# Patient Record
Sex: MALE | Race: WHITE | Employment: OTHER | URBAN - METROPOLITAN AREA
[De-identification: names, ages, dates, MRNs, and addresses within clinical notes are randomized per-mention and may not be internally consistent; named-entity substitution may affect disease eponyms.]

---

## 2020-02-04 ENCOUNTER — APPOINTMENT (OUTPATIENT)
Dept: CT IMAGING | Age: 70
DRG: 661 | End: 2020-02-04
Payer: MEDICARE

## 2020-02-04 ENCOUNTER — HOSPITAL ENCOUNTER (INPATIENT)
Age: 70
LOS: 1 days | Discharge: HOME | DRG: 661 | End: 2020-02-05
Attending: EMERGENCY MEDICINE | Admitting: FAMILY MEDICINE
Payer: MEDICARE

## 2020-02-04 PROBLEM — N23 URETERIC COLIC: Status: ACTIVE | Noted: 2020-02-04

## 2020-02-04 LAB
-: ABNORMAL
ABSOLUTE EOS #: 0.12 K/UL (ref 0–0.44)
ABSOLUTE IMMATURE GRANULOCYTE: 0.04 K/UL (ref 0–0.3)
ABSOLUTE LYMPH #: 1.65 K/UL (ref 1.1–3.7)
ABSOLUTE MONO #: 0.89 K/UL (ref 0.1–1.2)
ALBUMIN SERPL-MCNC: 4.5 G/DL (ref 3.5–5.2)
ALBUMIN/GLOBULIN RATIO: ABNORMAL (ref 1–2.5)
ALP BLD-CCNC: 80 U/L (ref 40–129)
ALT SERPL-CCNC: 56 U/L (ref 5–41)
AMORPHOUS: ABNORMAL
AMYLASE: 62 U/L (ref 28–100)
ANION GAP SERPL CALCULATED.3IONS-SCNC: 13 MMOL/L (ref 9–17)
APPEARANCE: NORMAL
AST SERPL-CCNC: 35 U/L
BACTERIA: ABNORMAL
BASOPHILS # BLD: 0 % (ref 0–2)
BASOPHILS ABSOLUTE: <0.03 K/UL (ref 0–0.2)
BILIRUB SERPL-MCNC: 0.7 MG/DL (ref 0.3–1.2)
BILIRUBIN DIRECT: 0.16 MG/DL
BILIRUBIN URINE: NEGATIVE
BILIRUBIN, INDIRECT: 0.54 MG/DL (ref 0–1)
BILIRUBIN, POC: NORMAL
BLOOD URINE, POC: NORMAL
BUN BLDV-MCNC: 17 MG/DL (ref 8–23)
BUN/CREAT BLD: 19 (ref 9–20)
CALCIUM SERPL-MCNC: 9.8 MG/DL (ref 8.6–10.4)
CASTS UA: ABNORMAL /LPF
CASTS UA: ABNORMAL /LPF
CHLORIDE BLD-SCNC: 102 MMOL/L (ref 98–107)
CHP ED QC CHECK: YES
CLARITY, POC: NORMAL
CO2: 25 MMOL/L (ref 20–31)
COLOR, POC: YELLOW
COLOR: YELLOW
COMMENT UA: ABNORMAL
CREAT SERPL-MCNC: 0.89 MG/DL (ref 0.7–1.2)
CRYSTALS, UA: ABNORMAL /HPF
DIFFERENTIAL TYPE: ABNORMAL
EOSINOPHILS RELATIVE PERCENT: 1 % (ref 1–4)
EPITHELIAL CELLS UA: ABNORMAL /HPF (ref 0–5)
GFR AFRICAN AMERICAN: >60 ML/MIN
GFR NON-AFRICAN AMERICAN: >60 ML/MIN
GFR SERPL CREATININE-BSD FRML MDRD: ABNORMAL ML/MIN/{1.73_M2}
GFR SERPL CREATININE-BSD FRML MDRD: ABNORMAL ML/MIN/{1.73_M2}
GLOBULIN: ABNORMAL G/DL (ref 1.5–3.8)
GLUCOSE BLD-MCNC: 155 MG/DL (ref 70–99)
GLUCOSE URINE, POC: NORMAL
GLUCOSE URINE: NEGATIVE
HCT VFR BLD CALC: 45.4 % (ref 40.7–50.3)
HEMOGLOBIN: 15.2 G/DL (ref 13–17)
IMMATURE GRANULOCYTES: 0 %
KETONES, POC: NORMAL
KETONES, URINE: NEGATIVE
LEUKOCYTE EST, POC: NORMAL
LEUKOCYTE ESTERASE, URINE: NEGATIVE
LIPASE: 24 U/L (ref 13–60)
LYMPHOCYTES # BLD: 13 % (ref 24–43)
MCH RBC QN AUTO: 31.7 PG (ref 25.2–33.5)
MCHC RBC AUTO-ENTMCNC: 33.5 G/DL (ref 28–38)
MCV RBC AUTO: 94.6 FL (ref 82.6–102.9)
MONOCYTES # BLD: 7 % (ref 3–12)
MUCUS: ABNORMAL
NITRITE, POC: NORMAL
NITRITE, URINE: NEGATIVE
NRBC AUTOMATED: ABNORMAL PER 100 WBC
OTHER OBSERVATIONS UA: ABNORMAL
PDW BLD-RTO: 12.5 % (ref 11.8–14.4)
PH UA: 6.5 (ref 5–8)
PH, POC: 7
PLATELET # BLD: 247 K/UL (ref 138–453)
PLATELET ESTIMATE: ABNORMAL
PMV BLD AUTO: 9.2 FL (ref 8.1–13.5)
POTASSIUM SERPL-SCNC: 4.4 MMOL/L (ref 3.7–5.3)
PROTEIN UA: ABNORMAL
PROTEIN, POC: NORMAL
RBC # BLD: 4.8 M/UL (ref 4.21–5.77)
RBC # BLD: ABNORMAL 10*6/UL
RBC UA: ABNORMAL /HPF (ref 0–2)
RENAL EPITHELIAL, UA: ABNORMAL /HPF
SEG NEUTROPHILS: 79 % (ref 36–65)
SEGMENTED NEUTROPHILS ABSOLUTE COUNT: 10.27 K/UL (ref 1.5–8.1)
SODIUM BLD-SCNC: 140 MMOL/L (ref 135–144)
SPECIFIC GRAVITY UA: 1.02 (ref 1–1.03)
SPECIFIC GRAVITY, POC: 1.02
TOTAL PROTEIN: 7.6 G/DL (ref 6.4–8.3)
TRICHOMONAS: ABNORMAL
TURBIDITY: ABNORMAL
URINE HGB: ABNORMAL
UROBILINOGEN, POC: 1
UROBILINOGEN, URINE: NORMAL
WBC # BLD: 13 K/UL (ref 3.5–11.3)
WBC # BLD: ABNORMAL 10*3/UL
WBC UA: ABNORMAL /HPF (ref 0–5)
YEAST: ABNORMAL

## 2020-02-04 PROCEDURE — 96376 TX/PRO/DX INJ SAME DRUG ADON: CPT

## 2020-02-04 PROCEDURE — 80076 HEPATIC FUNCTION PANEL: CPT

## 2020-02-04 PROCEDURE — 6360000002 HC RX W HCPCS: Performed by: EMERGENCY MEDICINE

## 2020-02-04 PROCEDURE — 82150 ASSAY OF AMYLASE: CPT

## 2020-02-04 PROCEDURE — 2580000003 HC RX 258: Performed by: NURSE PRACTITIONER

## 2020-02-04 PROCEDURE — 83690 ASSAY OF LIPASE: CPT

## 2020-02-04 PROCEDURE — 96375 TX/PRO/DX INJ NEW DRUG ADDON: CPT

## 2020-02-04 PROCEDURE — 80048 BASIC METABOLIC PNL TOTAL CA: CPT

## 2020-02-04 PROCEDURE — 96374 THER/PROPH/DIAG INJ IV PUSH: CPT

## 2020-02-04 PROCEDURE — 85025 COMPLETE CBC W/AUTO DIFF WBC: CPT

## 2020-02-04 PROCEDURE — 74176 CT ABD & PELVIS W/O CONTRAST: CPT

## 2020-02-04 PROCEDURE — 6360000002 HC RX W HCPCS: Performed by: NURSE PRACTITIONER

## 2020-02-04 PROCEDURE — 99285 EMERGENCY DEPT VISIT HI MDM: CPT

## 2020-02-04 PROCEDURE — 96361 HYDRATE IV INFUSION ADD-ON: CPT

## 2020-02-04 PROCEDURE — 1200000000 HC SEMI PRIVATE

## 2020-02-04 PROCEDURE — 81001 URINALYSIS AUTO W/SCOPE: CPT

## 2020-02-04 RX ORDER — MORPHINE SULFATE 4 MG/ML
4 INJECTION, SOLUTION INTRAMUSCULAR; INTRAVENOUS ONCE
Status: COMPLETED | OUTPATIENT
Start: 2020-02-04 | End: 2020-02-04

## 2020-02-04 RX ORDER — PEDIATRIC MULTIPLE VITAMIN LIQ
5 LIQUID ORAL
Status: ON HOLD | COMMUNITY
End: 2020-02-13 | Stop reason: CLARIF

## 2020-02-04 RX ORDER — RAMIPRIL 10 MG/1
CAPSULE ORAL 2 TIMES DAILY
COMMUNITY
Start: 2020-01-12

## 2020-02-04 RX ORDER — GLUCOSAMINE HCL 500 MG
100 TABLET ORAL 2 TIMES DAILY
COMMUNITY

## 2020-02-04 RX ORDER — ROSUVASTATIN CALCIUM 40 MG/1
40 TABLET, COATED ORAL DAILY
COMMUNITY
Start: 2020-01-12

## 2020-02-04 RX ORDER — CARVEDILOL 25 MG/1
25 TABLET ORAL 2 TIMES DAILY
COMMUNITY
Start: 2020-01-12

## 2020-02-04 RX ORDER — 0.9 % SODIUM CHLORIDE 0.9 %
1000 INTRAVENOUS SOLUTION INTRAVENOUS ONCE
Status: COMPLETED | OUTPATIENT
Start: 2020-02-04 | End: 2020-02-04

## 2020-02-04 RX ORDER — ASPIRIN 325 MG
325 TABLET ORAL
Status: ON HOLD | COMMUNITY
End: 2020-02-05 | Stop reason: HOSPADM

## 2020-02-04 RX ORDER — VIT C/B6/B5/MAGNESIUM/HERB 173 50-5-6-5MG
CAPSULE ORAL 2 TIMES DAILY
COMMUNITY

## 2020-02-04 RX ORDER — ONDANSETRON 2 MG/ML
4 INJECTION INTRAMUSCULAR; INTRAVENOUS ONCE
Status: COMPLETED | OUTPATIENT
Start: 2020-02-04 | End: 2020-02-04

## 2020-02-04 RX ADMIN — MORPHINE SULFATE 4 MG: 4 INJECTION INTRAVENOUS at 22:53

## 2020-02-04 RX ADMIN — ONDANSETRON 4 MG: 2 INJECTION INTRAMUSCULAR; INTRAVENOUS at 22:53

## 2020-02-04 RX ADMIN — SODIUM CHLORIDE 1000 ML: 9 INJECTION, SOLUTION INTRAVENOUS at 22:53

## 2020-02-04 RX ADMIN — MORPHINE SULFATE 4 MG: 4 INJECTION INTRAVENOUS at 23:34

## 2020-02-04 ASSESSMENT — ENCOUNTER SYMPTOMS
SHORTNESS OF BREATH: 0
ABDOMINAL PAIN: 1
ABDOMINAL DISTENTION: 1
VOMITING: 1
NAUSEA: 1

## 2020-02-04 ASSESSMENT — PAIN SCALES - GENERAL
PAINLEVEL_OUTOF10: 10
PAINLEVEL_OUTOF10: 8
PAINLEVEL_OUTOF10: 10

## 2020-02-04 ASSESSMENT — PAIN DESCRIPTION - FREQUENCY: FREQUENCY: CONTINUOUS

## 2020-02-04 ASSESSMENT — PAIN DESCRIPTION - LOCATION: LOCATION: BACK;ABDOMEN

## 2020-02-04 ASSESSMENT — PAIN DESCRIPTION - DESCRIPTORS: DESCRIPTORS: DISCOMFORT

## 2020-02-05 ENCOUNTER — APPOINTMENT (OUTPATIENT)
Dept: GENERAL RADIOLOGY | Age: 70
DRG: 661 | End: 2020-02-05
Payer: MEDICARE

## 2020-02-05 ENCOUNTER — ANESTHESIA EVENT (OUTPATIENT)
Dept: OPERATING ROOM | Age: 70
DRG: 661 | End: 2020-02-05
Payer: MEDICARE

## 2020-02-05 ENCOUNTER — ANESTHESIA (OUTPATIENT)
Dept: OPERATING ROOM | Age: 70
DRG: 661 | End: 2020-02-05
Payer: MEDICARE

## 2020-02-05 VITALS
WEIGHT: 184.2 LBS | RESPIRATION RATE: 18 BRPM | BODY MASS INDEX: 27.28 KG/M2 | OXYGEN SATURATION: 94 % | SYSTOLIC BLOOD PRESSURE: 118 MMHG | HEART RATE: 68 BPM | HEIGHT: 69 IN | TEMPERATURE: 98.6 F | DIASTOLIC BLOOD PRESSURE: 61 MMHG

## 2020-02-05 VITALS — SYSTOLIC BLOOD PRESSURE: 118 MMHG | OXYGEN SATURATION: 93 % | DIASTOLIC BLOOD PRESSURE: 56 MMHG

## 2020-02-05 LAB
-: NORMAL
AMORPHOUS: NORMAL
ANION GAP SERPL CALCULATED.3IONS-SCNC: 9 MMOL/L (ref 9–17)
BACTERIA: NORMAL
BILIRUBIN URINE: NEGATIVE
BUN BLDV-MCNC: 20 MG/DL (ref 8–23)
BUN/CREAT BLD: 18 (ref 9–20)
CALCIUM SERPL-MCNC: 9.1 MG/DL (ref 8.6–10.4)
CASTS UA: NORMAL /LPF
CHLORIDE BLD-SCNC: 106 MMOL/L (ref 98–107)
CO2: 27 MMOL/L (ref 20–31)
COLOR: YELLOW
COMMENT UA: ABNORMAL
CREAT SERPL-MCNC: 1.09 MG/DL (ref 0.7–1.2)
CRYSTALS, UA: NORMAL /HPF
EPITHELIAL CELLS UA: NORMAL /HPF (ref 0–5)
GFR AFRICAN AMERICAN: >60 ML/MIN
GFR NON-AFRICAN AMERICAN: >60 ML/MIN
GFR SERPL CREATININE-BSD FRML MDRD: ABNORMAL ML/MIN/{1.73_M2}
GFR SERPL CREATININE-BSD FRML MDRD: ABNORMAL ML/MIN/{1.73_M2}
GLUCOSE BLD-MCNC: 110 MG/DL (ref 75–110)
GLUCOSE BLD-MCNC: 120 MG/DL (ref 75–110)
GLUCOSE BLD-MCNC: 123 MG/DL (ref 75–110)
GLUCOSE BLD-MCNC: 132 MG/DL (ref 70–99)
GLUCOSE BLD-MCNC: 97 MG/DL (ref 75–110)
GLUCOSE URINE: NEGATIVE
HCT VFR BLD CALC: 41.1 % (ref 40.7–50.3)
HEMOGLOBIN: 14.1 G/DL (ref 13–17)
KETONES, URINE: NEGATIVE
LEUKOCYTE ESTERASE, URINE: NEGATIVE
MCH RBC QN AUTO: 32.3 PG (ref 25.2–33.5)
MCHC RBC AUTO-ENTMCNC: 34.3 G/DL (ref 28–38)
MCV RBC AUTO: 94.3 FL (ref 82.6–102.9)
MUCUS: NORMAL
NITRITE, URINE: NEGATIVE
NRBC AUTOMATED: ABNORMAL PER 100 WBC
OTHER OBSERVATIONS UA: NORMAL
PDW BLD-RTO: 12.6 % (ref 11.8–14.4)
PH UA: 6 (ref 5–8)
PLATELET # BLD: 212 K/UL (ref 138–453)
PMV BLD AUTO: 9.1 FL (ref 8.1–13.5)
POTASSIUM SERPL-SCNC: 4.8 MMOL/L (ref 3.7–5.3)
PROTEIN UA: NEGATIVE
RBC # BLD: 4.36 M/UL (ref 4.21–5.77)
RBC UA: NORMAL /HPF (ref 0–2)
RENAL EPITHELIAL, UA: NORMAL /HPF
SODIUM BLD-SCNC: 142 MMOL/L (ref 135–144)
SPECIFIC GRAVITY UA: 1.01 (ref 1–1.03)
TRICHOMONAS: NORMAL
TURBIDITY: CLEAR
URINE HGB: ABNORMAL
UROBILINOGEN, URINE: NORMAL
WBC # BLD: 11.6 K/UL (ref 3.5–11.3)
WBC UA: NORMAL /HPF (ref 0–5)
YEAST: NORMAL

## 2020-02-05 PROCEDURE — C2617 STENT, NON-COR, TEM W/O DEL: HCPCS | Performed by: UROLOGY

## 2020-02-05 PROCEDURE — 7100000001 HC PACU RECOVERY - ADDTL 15 MIN: Performed by: UROLOGY

## 2020-02-05 PROCEDURE — 6370000000 HC RX 637 (ALT 250 FOR IP): Performed by: UROLOGY

## 2020-02-05 PROCEDURE — 2709999900 HC NON-CHARGEABLE SUPPLY: Performed by: UROLOGY

## 2020-02-05 PROCEDURE — 2500000003 HC RX 250 WO HCPCS: Performed by: NURSE ANESTHETIST, CERTIFIED REGISTERED

## 2020-02-05 PROCEDURE — 6370000000 HC RX 637 (ALT 250 FOR IP): Performed by: FAMILY MEDICINE

## 2020-02-05 PROCEDURE — 3600000002 HC SURGERY LEVEL 2 BASE: Performed by: UROLOGY

## 2020-02-05 PROCEDURE — 3700000001 HC ADD 15 MINUTES (ANESTHESIA): Performed by: UROLOGY

## 2020-02-05 PROCEDURE — 36415 COLL VENOUS BLD VENIPUNCTURE: CPT

## 2020-02-05 PROCEDURE — 6360000002 HC RX W HCPCS: Performed by: ANESTHESIOLOGY

## 2020-02-05 PROCEDURE — 87086 URINE CULTURE/COLONY COUNT: CPT

## 2020-02-05 PROCEDURE — 6360000002 HC RX W HCPCS: Performed by: NURSE PRACTITIONER

## 2020-02-05 PROCEDURE — 3209999900 FLUORO FOR SURGICAL PROCEDURES

## 2020-02-05 PROCEDURE — 3700000000 HC ANESTHESIA ATTENDED CARE: Performed by: UROLOGY

## 2020-02-05 PROCEDURE — 74420 UROGRAPHY RTRGR +-KUB: CPT

## 2020-02-05 PROCEDURE — 81001 URINALYSIS AUTO W/SCOPE: CPT

## 2020-02-05 PROCEDURE — 3600000012 HC SURGERY LEVEL 2 ADDTL 15MIN: Performed by: UROLOGY

## 2020-02-05 PROCEDURE — 80048 BASIC METABOLIC PNL TOTAL CA: CPT

## 2020-02-05 PROCEDURE — C1758 CATHETER, URETERAL: HCPCS | Performed by: UROLOGY

## 2020-02-05 PROCEDURE — 6360000002 HC RX W HCPCS: Performed by: UROLOGY

## 2020-02-05 PROCEDURE — 7100000000 HC PACU RECOVERY - FIRST 15 MIN: Performed by: UROLOGY

## 2020-02-05 PROCEDURE — 74018 RADEX ABDOMEN 1 VIEW: CPT

## 2020-02-05 PROCEDURE — 6360000002 HC RX W HCPCS: Performed by: FAMILY MEDICINE

## 2020-02-05 PROCEDURE — 6360000002 HC RX W HCPCS: Performed by: NURSE ANESTHETIST, CERTIFIED REGISTERED

## 2020-02-05 PROCEDURE — 85027 COMPLETE CBC AUTOMATED: CPT

## 2020-02-05 PROCEDURE — 2580000003 HC RX 258: Performed by: NURSE ANESTHETIST, CERTIFIED REGISTERED

## 2020-02-05 PROCEDURE — BT141ZZ FLUOROSCOPY OF KIDNEYS, URETERS AND BLADDER USING LOW OSMOLAR CONTRAST: ICD-10-PCS | Performed by: UROLOGY

## 2020-02-05 PROCEDURE — 0T778DZ DILATION OF LEFT URETER WITH INTRALUMINAL DEVICE, VIA NATURAL OR ARTIFICIAL OPENING ENDOSCOPIC: ICD-10-PCS | Performed by: UROLOGY

## 2020-02-05 PROCEDURE — 2580000003 HC RX 258: Performed by: FAMILY MEDICINE

## 2020-02-05 PROCEDURE — 82947 ASSAY GLUCOSE BLOOD QUANT: CPT

## 2020-02-05 PROCEDURE — 6360000004 HC RX CONTRAST MEDICATION: Performed by: UROLOGY

## 2020-02-05 PROCEDURE — C1769 GUIDE WIRE: HCPCS | Performed by: UROLOGY

## 2020-02-05 DEVICE — URETERAL STENT
Type: IMPLANTABLE DEVICE | Site: URETER | Status: FUNCTIONAL
Brand: POLARIS™ ULTRA

## 2020-02-05 RX ORDER — HYDROCODONE BITARTRATE AND ACETAMINOPHEN 5; 325 MG/1; MG/1
1 TABLET ORAL EVERY 6 HOURS PRN
Qty: 28 TABLET | Refills: 0 | Status: SHIPPED | OUTPATIENT
Start: 2020-02-05 | End: 2020-02-12

## 2020-02-05 RX ORDER — SODIUM CHLORIDE 0.9 % (FLUSH) 0.9 %
10 SYRINGE (ML) INJECTION PRN
Status: DISCONTINUED | OUTPATIENT
Start: 2020-02-05 | End: 2020-02-05 | Stop reason: HOSPADM

## 2020-02-05 RX ORDER — PROPOFOL 10 MG/ML
INJECTION, EMULSION INTRAVENOUS PRN
Status: DISCONTINUED | OUTPATIENT
Start: 2020-02-05 | End: 2020-02-05 | Stop reason: SDUPTHER

## 2020-02-05 RX ORDER — OXYCODONE HYDROCHLORIDE AND ACETAMINOPHEN 5; 325 MG/1; MG/1
1 TABLET ORAL PRN
Status: DISCONTINUED | OUTPATIENT
Start: 2020-02-05 | End: 2020-02-05 | Stop reason: HOSPADM

## 2020-02-05 RX ORDER — HYDRALAZINE HYDROCHLORIDE 20 MG/ML
5 INJECTION INTRAMUSCULAR; INTRAVENOUS EVERY 10 MIN PRN
Status: DISCONTINUED | OUTPATIENT
Start: 2020-02-05 | End: 2020-02-05 | Stop reason: HOSPADM

## 2020-02-05 RX ORDER — CIPROFLOXACIN 2 MG/ML
400 INJECTION, SOLUTION INTRAVENOUS EVERY 12 HOURS
Status: DISCONTINUED | OUTPATIENT
Start: 2020-02-05 | End: 2020-02-05 | Stop reason: HOSPADM

## 2020-02-05 RX ORDER — ONDANSETRON 4 MG/1
4 TABLET, ORALLY DISINTEGRATING ORAL EVERY 6 HOURS PRN
Status: DISCONTINUED | OUTPATIENT
Start: 2020-02-05 | End: 2020-02-05 | Stop reason: HOSPADM

## 2020-02-05 RX ORDER — ACETAMINOPHEN 325 MG/1
650 TABLET ORAL EVERY 4 HOURS PRN
Status: DISCONTINUED | OUTPATIENT
Start: 2020-02-05 | End: 2020-02-05 | Stop reason: HOSPADM

## 2020-02-05 RX ORDER — FENTANYL CITRATE 50 UG/ML
25 INJECTION, SOLUTION INTRAMUSCULAR; INTRAVENOUS EVERY 5 MIN PRN
Status: DISCONTINUED | OUTPATIENT
Start: 2020-02-05 | End: 2020-02-05 | Stop reason: HOSPADM

## 2020-02-05 RX ORDER — HYDROMORPHONE HCL 110MG/55ML
0.5 PATIENT CONTROLLED ANALGESIA SYRINGE INTRAVENOUS EVERY 5 MIN PRN
Status: DISCONTINUED | OUTPATIENT
Start: 2020-02-05 | End: 2020-02-05 | Stop reason: HOSPADM

## 2020-02-05 RX ORDER — NICOTINE 21 MG/24HR
1 PATCH, TRANSDERMAL 24 HOURS TRANSDERMAL DAILY PRN
Status: DISCONTINUED | OUTPATIENT
Start: 2020-02-05 | End: 2020-02-05 | Stop reason: HOSPADM

## 2020-02-05 RX ORDER — OXYCODONE HYDROCHLORIDE AND ACETAMINOPHEN 5; 325 MG/1; MG/1
2 TABLET ORAL PRN
Status: DISCONTINUED | OUTPATIENT
Start: 2020-02-05 | End: 2020-02-05 | Stop reason: HOSPADM

## 2020-02-05 RX ORDER — ONDANSETRON 2 MG/ML
4 INJECTION INTRAMUSCULAR; INTRAVENOUS ONCE
Status: COMPLETED | OUTPATIENT
Start: 2020-02-05 | End: 2020-02-05

## 2020-02-05 RX ORDER — CARVEDILOL 3.12 MG/1
3.12 TABLET ORAL 2 TIMES DAILY WITH MEALS
Status: DISCONTINUED | OUTPATIENT
Start: 2020-02-05 | End: 2020-02-05 | Stop reason: HOSPADM

## 2020-02-05 RX ORDER — TAMSULOSIN HYDROCHLORIDE 0.4 MG/1
0.4 CAPSULE ORAL DAILY
Status: DISCONTINUED | OUTPATIENT
Start: 2020-02-05 | End: 2020-02-05 | Stop reason: HOSPADM

## 2020-02-05 RX ORDER — HYDROCODONE BITARTRATE AND ACETAMINOPHEN 5; 325 MG/1; MG/1
1 TABLET ORAL EVERY 4 HOURS PRN
Status: DISCONTINUED | OUTPATIENT
Start: 2020-02-05 | End: 2020-02-05 | Stop reason: HOSPADM

## 2020-02-05 RX ORDER — DIPHENHYDRAMINE HYDROCHLORIDE 50 MG/ML
12.5 INJECTION INTRAMUSCULAR; INTRAVENOUS
Status: DISCONTINUED | OUTPATIENT
Start: 2020-02-05 | End: 2020-02-05 | Stop reason: HOSPADM

## 2020-02-05 RX ORDER — CEFAZOLIN SODIUM 1 G/3ML
INJECTION, POWDER, FOR SOLUTION INTRAMUSCULAR; INTRAVENOUS PRN
Status: DISCONTINUED | OUTPATIENT
Start: 2020-02-05 | End: 2020-02-05 | Stop reason: SDUPTHER

## 2020-02-05 RX ORDER — SODIUM CHLORIDE 0.9 % (FLUSH) 0.9 %
10 SYRINGE (ML) INJECTION EVERY 12 HOURS SCHEDULED
Status: DISCONTINUED | OUTPATIENT
Start: 2020-02-05 | End: 2020-02-05 | Stop reason: HOSPADM

## 2020-02-05 RX ORDER — SODIUM CHLORIDE 9 MG/ML
INJECTION, SOLUTION INTRAVENOUS CONTINUOUS
Status: DISCONTINUED | OUTPATIENT
Start: 2020-02-05 | End: 2020-02-05 | Stop reason: HOSPADM

## 2020-02-05 RX ORDER — DEXAMETHASONE SODIUM PHOSPHATE 10 MG/ML
4 INJECTION INTRAMUSCULAR; INTRAVENOUS
Status: DISCONTINUED | OUTPATIENT
Start: 2020-02-05 | End: 2020-02-05 | Stop reason: HOSPADM

## 2020-02-05 RX ORDER — HYDROMORPHONE HYDROCHLORIDE 1 MG/ML
1 INJECTION, SOLUTION INTRAMUSCULAR; INTRAVENOUS; SUBCUTANEOUS ONCE
Status: COMPLETED | OUTPATIENT
Start: 2020-02-05 | End: 2020-02-05

## 2020-02-05 RX ORDER — LIDOCAINE HYDROCHLORIDE 20 MG/ML
INJECTION, SOLUTION INFILTRATION; PERINEURAL PRN
Status: DISCONTINUED | OUTPATIENT
Start: 2020-02-05 | End: 2020-02-05 | Stop reason: SDUPTHER

## 2020-02-05 RX ORDER — MORPHINE SULFATE 4 MG/ML
4 INJECTION, SOLUTION INTRAMUSCULAR; INTRAVENOUS
Status: DISCONTINUED | OUTPATIENT
Start: 2020-02-05 | End: 2020-02-05 | Stop reason: HOSPADM

## 2020-02-05 RX ORDER — FAMOTIDINE 20 MG/1
20 TABLET, FILM COATED ORAL 2 TIMES DAILY
Status: DISCONTINUED | OUTPATIENT
Start: 2020-02-05 | End: 2020-02-05 | Stop reason: HOSPADM

## 2020-02-05 RX ORDER — ONDANSETRON 2 MG/ML
4 INJECTION INTRAMUSCULAR; INTRAVENOUS EVERY 6 HOURS PRN
Status: DISCONTINUED | OUTPATIENT
Start: 2020-02-05 | End: 2020-02-05 | Stop reason: SDUPTHER

## 2020-02-05 RX ORDER — ONDANSETRON 2 MG/ML
INJECTION INTRAMUSCULAR; INTRAVENOUS PRN
Status: DISCONTINUED | OUTPATIENT
Start: 2020-02-05 | End: 2020-02-05 | Stop reason: SDUPTHER

## 2020-02-05 RX ORDER — HYDROCODONE BITARTRATE AND ACETAMINOPHEN 5; 325 MG/1; MG/1
2 TABLET ORAL EVERY 4 HOURS PRN
Status: DISCONTINUED | OUTPATIENT
Start: 2020-02-05 | End: 2020-02-05 | Stop reason: HOSPADM

## 2020-02-05 RX ORDER — MORPHINE SULFATE 2 MG/ML
2 INJECTION, SOLUTION INTRAMUSCULAR; INTRAVENOUS
Status: DISCONTINUED | OUTPATIENT
Start: 2020-02-05 | End: 2020-02-05 | Stop reason: HOSPADM

## 2020-02-05 RX ORDER — SODIUM CHLORIDE 9 MG/ML
INJECTION, SOLUTION INTRAVENOUS CONTINUOUS PRN
Status: DISCONTINUED | OUTPATIENT
Start: 2020-02-05 | End: 2020-02-05 | Stop reason: SDUPTHER

## 2020-02-05 RX ORDER — ONDANSETRON 2 MG/ML
4 INJECTION INTRAMUSCULAR; INTRAVENOUS EVERY 6 HOURS PRN
Status: DISCONTINUED | OUTPATIENT
Start: 2020-02-05 | End: 2020-02-05 | Stop reason: HOSPADM

## 2020-02-05 RX ORDER — LABETALOL HYDROCHLORIDE 5 MG/ML
5 INJECTION, SOLUTION INTRAVENOUS EVERY 10 MIN PRN
Status: DISCONTINUED | OUTPATIENT
Start: 2020-02-05 | End: 2020-02-05 | Stop reason: HOSPADM

## 2020-02-05 RX ORDER — ONDANSETRON 2 MG/ML
4 INJECTION INTRAMUSCULAR; INTRAVENOUS
Status: DISCONTINUED | OUTPATIENT
Start: 2020-02-05 | End: 2020-02-05 | Stop reason: HOSPADM

## 2020-02-05 RX ORDER — MEPERIDINE HYDROCHLORIDE 50 MG/ML
12.5 INJECTION INTRAMUSCULAR; INTRAVENOUS; SUBCUTANEOUS EVERY 5 MIN PRN
Status: DISCONTINUED | OUTPATIENT
Start: 2020-02-05 | End: 2020-02-05 | Stop reason: HOSPADM

## 2020-02-05 RX ORDER — LIDOCAINE HYDROCHLORIDE 20 MG/ML
JELLY TOPICAL PRN
Status: DISCONTINUED | OUTPATIENT
Start: 2020-02-05 | End: 2020-02-05 | Stop reason: HOSPADM

## 2020-02-05 RX ADMIN — Medication 25 MCG: at 10:06

## 2020-02-05 RX ADMIN — SODIUM CHLORIDE: 9 INJECTION, SOLUTION INTRAVENOUS at 01:30

## 2020-02-05 RX ADMIN — Medication 50 MCG: at 10:23

## 2020-02-05 RX ADMIN — LIDOCAINE HYDROCHLORIDE 50 MG: 20 INJECTION, SOLUTION INFILTRATION; PERINEURAL at 09:52

## 2020-02-05 RX ADMIN — HYDROMORPHONE HYDROCHLORIDE 1 MG: 1 INJECTION, SOLUTION INTRAMUSCULAR; INTRAVENOUS; SUBCUTANEOUS at 00:10

## 2020-02-05 RX ADMIN — PROPOFOL 150 MG: 10 INJECTION, EMULSION INTRAVENOUS at 09:52

## 2020-02-05 RX ADMIN — ONDANSETRON 4 MG: 2 INJECTION INTRAMUSCULAR; INTRAVENOUS at 00:09

## 2020-02-05 RX ADMIN — Medication 25 MCG: at 09:48

## 2020-02-05 RX ADMIN — CIPROFLOXACIN 400 MG: 2 INJECTION, SOLUTION INTRAVENOUS at 12:57

## 2020-02-05 RX ADMIN — HYDROCODONE BITARTRATE AND ACETAMINOPHEN 2 TABLET: 5; 325 TABLET ORAL at 12:58

## 2020-02-05 RX ADMIN — CEFAZOLIN 2000 MG: 1 INJECTION, POWDER, FOR SOLUTION INTRAMUSCULAR; INTRAVENOUS at 10:05

## 2020-02-05 RX ADMIN — MORPHINE SULFATE 2 MG: 2 INJECTION, SOLUTION INTRAMUSCULAR; INTRAVENOUS at 05:58

## 2020-02-05 RX ADMIN — CIPROFLOXACIN 400 MG: 2 INJECTION, SOLUTION INTRAVENOUS at 01:30

## 2020-02-05 RX ADMIN — SODIUM CHLORIDE: 9 INJECTION, SOLUTION INTRAVENOUS at 10:05

## 2020-02-05 RX ADMIN — ONDANSETRON 4 MG: 2 INJECTION, SOLUTION INTRAMUSCULAR; INTRAVENOUS at 09:52

## 2020-02-05 RX ADMIN — HYDROCODONE BITARTRATE AND ACETAMINOPHEN 2 TABLET: 5; 325 TABLET ORAL at 01:30

## 2020-02-05 RX ADMIN — SODIUM CHLORIDE: 9 INJECTION, SOLUTION INTRAVENOUS at 09:48

## 2020-02-05 RX ADMIN — FAMOTIDINE 20 MG: 20 TABLET ORAL at 01:30

## 2020-02-05 ASSESSMENT — PAIN SCALES - GENERAL
PAINLEVEL_OUTOF10: 0
PAINLEVEL_OUTOF10: 0
PAINLEVEL_OUTOF10: 5
PAINLEVEL_OUTOF10: 0
PAINLEVEL_OUTOF10: 7
PAINLEVEL_OUTOF10: 7
PAINLEVEL_OUTOF10: 6
PAINLEVEL_OUTOF10: 8
PAINLEVEL_OUTOF10: 2
PAINLEVEL_OUTOF10: 4
PAINLEVEL_OUTOF10: 6
PAINLEVEL_OUTOF10: 0

## 2020-02-05 ASSESSMENT — PAIN DESCRIPTION - LOCATION
LOCATION: FLANK;ABDOMEN
LOCATION: FLANK;ABDOMEN
LOCATION: FLANK

## 2020-02-05 ASSESSMENT — PULMONARY FUNCTION TESTS
PIF_VALUE: 4
PIF_VALUE: 3
PIF_VALUE: 4
PIF_VALUE: 3
PIF_VALUE: 1
PIF_VALUE: 2
PIF_VALUE: 4
PIF_VALUE: 1
PIF_VALUE: 3
PIF_VALUE: 15
PIF_VALUE: 3
PIF_VALUE: 4
PIF_VALUE: 3
PIF_VALUE: 1
PIF_VALUE: 4
PIF_VALUE: 1
PIF_VALUE: 4
PIF_VALUE: 1
PIF_VALUE: 3
PIF_VALUE: 0
PIF_VALUE: 3
PIF_VALUE: 4
PIF_VALUE: 2
PIF_VALUE: 4
PIF_VALUE: 2
PIF_VALUE: 1
PIF_VALUE: 4
PIF_VALUE: 1

## 2020-02-05 ASSESSMENT — PAIN DESCRIPTION - DESCRIPTORS
DESCRIPTORS: CONSTANT
DESCRIPTORS: BURNING
DESCRIPTORS: CONSTANT

## 2020-02-05 ASSESSMENT — PAIN DESCRIPTION - ORIENTATION
ORIENTATION: LEFT

## 2020-02-05 ASSESSMENT — PAIN DESCRIPTION - PAIN TYPE
TYPE: ACUTE PAIN
TYPE: ACUTE PAIN

## 2020-02-05 ASSESSMENT — PAIN DESCRIPTION - FREQUENCY
FREQUENCY: CONTINUOUS
FREQUENCY: CONTINUOUS

## 2020-02-05 ASSESSMENT — PAIN DESCRIPTION - ONSET
ONSET: ON-GOING
ONSET: ON-GOING

## 2020-02-05 NOTE — CARE COORDINATION
Case Management Initial Discharge Plan  Han Lorelei,         Readmission Risk              Risk of Unplanned Readmission:        8             Met with:patient or spouse/SO to discuss discharge plans.    Harrison Walter to talk to him w/ wife in the room  Information verified: address, contacts, phone number, , insurance Yes  PCP: Yosef Galeas DO  Date of last visit: 2019    Insurance Provider: Blanca Loving [employer]    Discharge Planning  Current Residence:  house  Living Arrangements:  Spouse/Significant Other   Home has 1 stories/2 stairs to climb  Support Systems:  Spouse/Significant Other, Children, Family Members, Friends/Neighbors  Current Services PTA:  none Agency: none  Patient able to perform ADL's:Independent  DME in home:  none  DME used to aid ambulation prior to admission:   none  DME used during admission:  tbd    Potential Assistance Needed:  N/A    Pharmacy: [facility] in [address] [place] [address] Medications:  No  Does patient want to participate in local refill/ meds to beds program?  No    Patient agreeable to home care: No  Kamuela of choice provided:  n/a      Type of Home Care Services:  None  Patient expects to be discharged to:  Home    Prior SNF/Rehab Placement and Facility: none  Agreeable to SNF/Rehab: No  Kamuela of choice provided: n/a   Evaluation: no    Expected Discharge date:  20  Follow Up Appointment: Best Day/ Time: Wednesday AM    Transportation provider: yes,   Transportation arrangements needed for discharge: No    Discharge Plan: home w/ family support  Lives w/ wife in a 1 level home 2/ 2 steps to get into the home  Independent of his adl's  Active   Wife will make the f/u appoint w/ Dr Ayden Kramer in 1 week and pcp  Will continue to follow      Electronically signed by Balbir Pierce RN on 20 at 12:54 PM

## 2020-02-05 NOTE — OP NOTE
[facility]    Operative Note    Сергей Gonzales  YOB: 1950  [id number]      Pre-operative Diagnosis: Left ureteral calculus with hydronephrosis    Post-operative Diagnosis: Same, with prostate hypertrophy and bladder outlet obstruction    Procedure: Urethral dilation cystoscopy bilateral retrograde pyelogram left ureteral stent placement    Anesthesia: General    Surgeons/Assistants: Dr Neville Peck    Estimated Blood Loss: No blood loss    Complications: None    Specimens: Was Obtained: Urine    Indications: 79-year-old male admitted through the emergency room with severe onset of ureteral colic obstructive uropathy distal ureteral calculus on the left patient continued with intractable pain he was scheduled for cystoscopy pyelogram and stent placement    Operative Findings: Patient was brought to the operating room positioned in dorsal lithotomy proper patient identification and procedure identification completed with prepping and draping under general anesthesia    Patient has meatal stenosis, we dilated the meatus and the fossa navicularis through size 22 [place]na using the sounds, we entered the bladder with the size 22 cystoscope urethroscopy and examination of the prostate fossa demonstrated lateral lobe hypertrophy with visual occlusion of the bladder outlet also evidence of median lobe hypertrophy    The anterior the bladder was carefully examined, bladder trabeculations identified, bilateral retrograde pyelography using a #8 cone-tip ureteral catheter was carried out    Contrast material mixed with tobramycin injected in both ureters. The left ureteral obstruction is documented with left hydronephrosis, the right ureter is unremarkable. Glidewire was then inserted in the left ureter into the renal pelvis and followed by insertion of a #7 double-J stent 28 cm long positioned in the renal pelvis with the distal end in the bladder.     Immediate

## 2020-02-05 NOTE — ED PROVIDER NOTES
[facility]  EMERGENCY DEPARTMENT ENCOUNTER      Pt Name: Vaughn Fernandez  MRN: [id number]  [place] 1950  Date of evaluation: 2/4/2020  Provider: Joanie Rawls       Chief Complaint   Patient presents with    Abdominal Pain     2000, lower    Back Pain         HISTORY OFPRESENT ILLNESS  (Location/Symptom, Timing/Onset, Context/Setting, Quality, Duration, Modifying Factors, Severity.)   Vaughn Fernandez is a 78 y.o. male who presents to the emergency department for evaluation of left flank pain, abdominal pain, nausea, and vomiting started on 8:00 this evening. Rates the pain a 10 out of 10. No chest pain, cough or shortness of breath. Nursing Notes were reviewed. PASTMEDICAL HISTORY     Past Medical History:   Diagnosis Date    Arthritis     Diabetes mellitus ([facility] [address]     Hyperlipidemia     Hypertension          SURGICAL HISTORY       Past Surgical History:   Procedure Laterality Date    KNEE SURGERY      ROTATOR CUFF REPAIR           CURRENT MEDICATIONS     Previous Medications    ACETYLCYSTEINE 600 MG CAPS    Take 600 mg by mouth    ASPIRIN 325 MG TABLET    Take 325 mg by mouth    B COMPLEX VITAMINS ER PO    Take 1 tablet by mouth    CARVEDILOL (COREG) 25 MG TABLET        COENZYME Q10 100 MG TABS    Take 100 mg by mouth    FLAXSEED, LINSEED, (FLAXSEED OIL PO)    Take by mouth    KRILL OIL PO    Take by mouth    METFORMIN (GLUCOPHAGE) 500 MG TABLET        PEDIATRIC MULTIPLE VITAMINS (MULTIVITAMIN) LIQD ORAL SOLUTION    Take 5 mLs by mouth    RAMIPRIL (ALTACE) 10 MG CAPSULE        ROSUVASTATIN (CRESTOR) 40 MG TABLET        TURMERIC 500 MG CAPS    Take by mouth    VITAMIN D-3 (CHOLECALCIFEROL) 125 MCG (5000 UT) TABS    Take 5,000 Units by mouth       ALLERGIES     Patient has no known allergies. FAMILY HISTORY     History reviewed. No pertinent family history.        SOCIAL HISTORY       Social History     Socioeconomic History    Marital status:      Spouse and well-groomed. HENT:      Head: Normocephalic and atraumatic. Right Ear: External ear normal.      Left Ear: External ear normal.      Nose: Nose normal.      Mouth/Throat:      Mouth: Mucous membranes are moist.      Pharynx: Oropharynx is clear. Eyes:      Extraocular Movements: Extraocular movements intact. Conjunctiva/sclera: Conjunctivae normal.      Pupils: Pupils are equal, round, and reactive to light. Neck:      Musculoskeletal: Normal range of motion and neck supple. Cardiovascular:      Rate and Rhythm: Normal rate and regular rhythm. Pulses: Normal pulses. Heart sounds: Normal heart sounds. Pulmonary:      Effort: Pulmonary effort is normal. No respiratory distress. Abdominal:      General: Bowel sounds are normal. There is distension. Tenderness: There is generalized abdominal tenderness. There is left CVA tenderness. Musculoskeletal: Normal range of motion. Skin:     General: Skin is warm and moist.      Capillary Refill: Capillary refill takes less than 2 seconds. Neurological:      Mental Status: He is alert and oriented to person, place, and time. Cranial Nerves: Cranial nerves are intact. Sensory: Sensation is intact. Motor: Motor function is intact. Coordination: Coordination is intact. Gait: Gait is intact. Psychiatric:         Mood and Affect: Mood normal.         Behavior: Behavior normal.         Thought Content:  Thought content normal.         Judgment: Judgment normal.           DIAGNOSTIC RESULTS     EKG:All EKG's are interpreted by the Emergency Department Physician who either signs or Co-signs this chart in the absence of a cardiologist.        RADIOLOGY:   Non-plain film images such as CT, Ultrasound and MRI are read by theradiologist. Plain radiographic images are visualized and preliminarily interpreted by the emergency physician with the below findings:  Ct Abdomen Pelvis Wo Contrast Additional Contrast? calculus with hydroureteronephrosis. Additional ancillary nonacute findings as above. Interpretation per the Radiologist below, if available at the time of this note:    CT ABDOMEN PELVIS WO CONTRAST Additional Contrast? None   Preliminary Result   Obstructing 0.4 cm left UVJ calculus with hydroureteronephrosis. Additional ancillary nonacute findings as above. EDBEDSIDE ULTRASOUND:   Performed by [employer] - none    LABS:  Labs Reviewed   CBC WITH AUTO DIFFERENTIAL - Abnormal; Notable for the following components:       Result Value    WBC 13.0 (*)     Seg Neutrophils 79 (*)     Lymphocytes 13 (*)     Segs Absolute 10.27 (*)     All other components within normal limits   BASIC METABOLIC PANEL - Abnormal; Notable for the following components:    Glucose 155 (*)     All other components within normal limits   HEPATIC FUNCTION PANEL - Abnormal; Notable for the following components:    ALT 56 (*)     All other components within normal limits   URINALYSIS - Abnormal; Notable for the following components:    Turbidity UA CLOUDY (*)     Urine Hgb 3+ (*)     Protein, UA TRACE (*)     All other components within normal limits   MICROSCOPIC URINALYSIS - Abnormal; Notable for the following components:    Crystals UA 0 TO 2 CALCIUM OXALATE (*)     Bacteria, UA FEW (*)     Mucus, UA 1+ (*)     Amorphous, UA 1+ (*)     All other components within normal limits   POCT URINALYSIS DIPSTICK - Normal   LIPASE   AMYLASE       All other labs were within normal range or not returned as of this dictation. EMERGENCY DEPARTMENT COURSE andDIFFERENTIAL DIAGNOSIS/MDM:   Patient was evaluated in conjunction with attending physician. Labs reviewed. WBC 13.0. Urinalysis shows large amount of blood negative leukocytes. Urine sent for culture. CT of abdomen pelvis per radiologist shows Obstructing 0.4 cm left UVJ calculus with hydroureteronephrosis. Patient was given pain nausea medication emergency department.

## 2020-02-05 NOTE — ANESTHESIA PRE PROCEDURE
Department of Anesthesiology  Preprocedure Note       Name:  Daniel Reynoso   Age:  78 y.o.  :  1950                                          MRN:  [id number]         Date:  2020      Surgeon: Tucker Harrington):  Xander Collier MD    Procedure: CYSTOSCOPY RETROGRADE PYELOGRAM URETERAL STENT INSERTION (N/A )    Medications prior to admission:   Prior to Admission medications    Medication Sig Start Date End Date Taking?  Authorizing Provider   ramipril (ALTACE) 10 MG capsule  20  Yes Historical Provider, MD   carvedilol (COREG) 25 MG tablet Take 25 mg by mouth 2 times daily  20  Yes Historical Provider, MD   rosuvastatin (CRESTOR) 40 MG tablet  20  Yes Historical Provider, MD   metFORMIN (GLUCOPHAGE) 500 MG tablet  20  Yes Historical Provider, MD   aspirin 325 MG tablet Take 325 mg by mouth   Yes Historical Provider, MD   B COMPLEX VITAMINS ER PO Take 1 tablet by mouth   Yes Historical Provider, MD   Pediatric Multiple Vitamins (MULTIVITAMIN) LIQD oral solution Take 5 mLs by mouth   Yes Historical Provider, MD   KRILL OIL PO Take by mouth   Yes Historical Provider, MD   vitamin D-3 (CHOLECALCIFEROL) 125 MCG (5000 UT) TABS Take 5,000 Units by mouth   Yes Historical Provider, MD   Acetylcysteine 600 MG CAPS Take 600 mg by mouth   Yes Historical Provider, MD   Flaxseed, Linseed, (FLAXSEED OIL PO) Take by mouth   Yes Historical Provider, MD   Coenzyme Q10 100 MG TABS Take 100 mg by mouth   Yes Historical Provider, MD   Turmeric 500 MG CAPS Take by mouth   Yes Historical Provider, MD       Current medications:    Current Facility-Administered Medications   Medication Dose Route Frequency Provider Last Rate Last Dose    [MAR Hold] carvedilol (COREG) tablet 3.125 mg  3.125 mg Oral BID  Ilene Colon MD        [facility] Hold] 0.9 % sodium chloride infusion   Intravenous Continuous Gini Webster  mL/hr at 20 0130      [MAR Hold] sodium chloride flush 0.9 % injection 10 mL  10 mL Intravenous 2 times per day Tatyana John MD        [facility] Hold] sodium chloride flush 0.9 % injection 10 mL  10 mL Intravenous PRN Tatyana John MD        [facility] Hold] ciprofloxacin (CIPRO) IVPB 400 mg  400 mg Intravenous Q12H Tatyana John MD   Stopped at 02/05/20 0230    [MAR Hold] acetaminophen (TYLENOL) tablet 650 mg  650 mg Oral Q4H PRN Tatyana John MD        [MAR Hold] HYDROcodone-acetaminophen (NORCO) 5-325 MG per tablet 1 tablet  1 tablet Oral Q4H PRN Tatyana John MD        Or   [facility]il[facility] Shrestha Hold] HYDROcodone-acetaminophen (NORCO) 5-325 MG per tablet 2 tablet  2 tablet Oral Q4H PRN Tatyana John MD   2 tablet at 02/05/20 0130    [MAR Hold] morphine (PF) injection 2 mg  2 mg Intravenous Q2H PRN Gini Webster MD   2 mg at 02/05/20 0558    Or    [MAR Hold] morphine sulfate (PF) injection 4 mg  4 mg Intravenous Q2H PRN Tatyana John MD        [MAR Hold] magnesium hydroxide (MILK OF MAGNESIA) 400 MG/5ML suspension 30 mL  30 mL Oral Daily PRN Tatyana John MD        [MAR Hold] famotidine (PEPCID) tablet 20 mg  20 mg Oral BID Gini Webster MD   20 mg at 02/05/20 0130    [MAR Hold] nicotine (NICODERM CQ) 21 MG/24HR 1 patch  1 patch Transdermal Daily PRN Tatyana John MD        [MAR Hold] enoxaparin (LOVENOX) injection 40 mg  40 mg Subcutaneous Daily Tatyana John MD        [MAR Hold] tamsulosin (FLOMAX) capsule 0.4 mg  0.4 mg Oral Daily Tatyana John MD        [MAR Hold] ondansetron (ZOFRAN-ODT) disintegrating tablet 4 mg  4 mg Oral Q6H PRN Gini Webster MD        Or   [facility] Shrestha Hold] ondansetron (ZOFRAN) injection 4 mg  4 mg Intravenous Q6H PRN Tatyana John MD         Facility-Administered Medications Ordered in Other Encounters   Medication Dose Route Frequency Provider Last Rate Last Dose    lidocaine 2 % injection    PRN Clem Fabry, APRN - CRNA   50 mg at 02/05/20 6372    propofol injection    PRN Clem Fabry, APRN - CRNA   150 mg at 02/05/20 2436       Allergies:  No Known Allergies    Problem List:    Patient Active Problem List   Diagnosis Code    Ureteric colic E17       Past Medical History:        Diagnosis Date    Arthritis     Diabetes mellitus ([facility] [address]     Hyperlipidemia     Hypertension        Past Surgical History:        Procedure Laterality Date    KNEE SURGERY      ROTATOR CUFF REPAIR         Social History:    Social History     Tobacco Use    Smoking status: Never Smoker    Smokeless tobacco: Never Used   Substance Use Topics    Alcohol use: Not Currently                                Counseling given: Not Answered      Vital Signs (Current):   Vitals:    02/04/20 2329 02/05/20 0032 02/05/20 0433 02/05/20 0611   BP: (!) 158/60 (!) 147/71  132/63   Pulse:  50  64   Resp:  18  18   Temp:  97.5 °F (36.4 °C)  98.1 °F (36.7 °C)   TempSrc:  Oral  Oral   SpO2:  93%  95%   Weight:   184 lb 3.2 oz (83.6 kg)    Height:                                                  BP Readings from Last 3 Encounters:   02/05/20 132/63   02/05/20 121/62       NPO Status: Time of last liquid consumption: 1230                        Time of last solid consumption: 1230                        Date of last liquid consumption: 02/05/20                        Date of last solid food consumption: 02/05/20    BMI:   Wt Readings from Last 3 Encounters:   02/05/20 184 lb 3.2 oz (83.6 kg)     Body mass index is 27.2 kg/m².     CBC:   Lab Results   Component Value Date    WBC 11.6 02/05/2020    RBC 4.36 02/05/2020    HGB 14.1 02/05/2020    HCT 41.1 02/05/2020    MCV 94.3 02/05/2020    RDW 12.6 02/05/2020     02/05/2020       CMP:   Lab Results   Component Value Date     02/05/2020    K 4.8 02/05/2020     02/05/2020    CO2 27 02/05/2020    BUN 20 02/05/2020    CREATININE 1.09 02/05/2020    GFRAA >60 02/05/2020    LABGLOM >60 02/05/2020    GLUCOSE 132 02/05/2020    PROT 7.6 02/04/2020    CALCIUM 9.1 02/05/2020    BILITOT 0.70 02/04/2020    ALKPHOS 80 02/04/2020    AST 35 02/04/2020    ALT 56 02/04/2020

## 2020-02-05 NOTE — ED NOTES
Pt presents to the ED via private auto for left flank pain, back pain, nausea. Pt states symptoms started around 2000. Pt rates pain a 10/10. Pt is rolling around in bed in pain. Pt denies history of kidney stones.       Nany Devries RN  02/05/20 0705

## 2020-02-05 NOTE — PROGRESS NOTES
02/05/20  1155 02/05/20  1605   POCGLU 123* 110 97 120*         General appearance - alert, well appearing, and in no acute distress  Mental status - oriented to person, place, and time with normal affect  Head - normocephalic and atraumatic  Eyes - pupils equal and reactive, extraocular eye movements intact, conjunctiva clear  Ears - hearing appears to be intact  Nose - no drainage noted  Mouth - mucous membranes moist  Neck - supple, no carotid bruits, thyroid not palpable  Chest - clear to auscultation, normal effort  Heart - normal rate, regular rhythm, no murmur  Abdomen - soft, nontender, nondistended, bowel sounds present all four quadrants, no masses, hepatomegaly or splenomegaly  Neurological - normal speech, no focal findings or movement disorder noted, cranial nerves II through XII grossly intact  Extremities - peripheral pulses palpable, no pedal edema or calf pain with palpation  Skin - no gross lesions, rashes, or induration noted        Medications:      Allergies: No Known Allergies    Current Meds:   Current Facility-Administered Medications:     carvedilol (COREG) tablet 3.125 mg, 3.125 mg, Oral, BID WC, Miesha Pang MD    0.9 % sodium chloride infusion, , Intravenous, Continuous, Miesha Pang MD, Last Rate: 150 mL/hr at 02/05/20 0130    sodium chloride flush 0.9 % injection 10 mL, 10 mL, Intravenous, 2 times per day, Miesha Pang MD    sodium chloride flush 0.9 % injection 10 mL, 10 mL, Intravenous, PRN, Miesha Pang MD    ciprofloxacin (CIPRO) IVPB 400 mg, 400 mg, Intravenous, Q12H, Miesha Pang MD, Stopped at 02/05/20 1357    acetaminophen (TYLENOL) tablet 650 mg, 650 mg, Oral, Q4H PRN, Miesha Pang MD    HYDROcodone-acetaminophen (NORCO) 5-325 MG per tablet 1 tablet, 1 tablet, Oral, Q4H PRN **OR** HYDROcodone-acetaminophen (NORCO) 5-325 MG per tablet 2 tablet, 2 tablet, Oral, Q4H PRN, Miesha Pang MD, 2 tablet at 02/05/20 1258    morphine (PF) injection 2 mg, 2 mg, Intravenous, Q2H PRN, 2 mg at 02/05/20 0558 **OR** morphine sulfate (PF) injection 4 mg, 4 mg, Intravenous, Q2H PRN, Jimbo Torres MD    magnesium hydroxide (MILK OF MAGNESIA) 400 MG/5ML suspension 30 mL, 30 mL, Oral, Daily PRN, Jimbo Torres MD    famotidine (PEPCID) tablet 20 mg, 20 mg, Oral, BID, Jimbo Torres MD, 20 mg at 02/05/20 0130    nicotine (NICODERM CQ) 21 MG/24HR 1 patch, 1 patch, Transdermal, Daily PRN, Jimbo Torres MD    enoxaparin (LOVENOX) injection 40 mg, 40 mg, Subcutaneous, Daily, Jimbo Torres MD    [facility]) capsule 0.4 mg, 0.4 mg, Oral, Daily, Jimbo Torres MD    ondansetron (ZOFRAN-ODT) disintegrating tablet 4 mg, 4 mg, Oral, Q6H PRN **OR** ondansetron (ZOFRAN) injection 4 mg, 4 mg, Intravenous, Q6H PRN, Jimbo Torres MD      I/O (24Hr):     Intake/Output Summary (Last 24 hours) at 2/5/2020 1836  Last data filed at 2/5/2020 1723  Gross per 24 hour   Intake 1610 ml   Output 1100 ml   Net 510 ml       Data:           Labs:    Hematology:  Recent Labs     02/04/20 2248 02/05/20 0527   WBC 13.0* 11.6*   RBC 4.80 4.36   HGB 15.2 14.1   HCT 45.4 41.1   MCV 94.6 94.3   MCH 31.7 32.3   MCHC 33.5 34.3   RDW 12.5 12.6    212   MPV 9.2 9.1     Chemistry:  Recent Labs     02/04/20 2248 02/05/20 0527    142   K 4.4 4.8    106   CO2 25 27   GLUCOSE 155* 132*   BUN 17 20   CREATININE 0.89 1.09   ANIONGAP 13 9   LABGLOM >60 >60   GFRAA >60 >60   CALCIUM 9.8 9.1     Recent Labs     02/04/20 2248 02/05/20  0613 02/05/20  1031 02/05/20  1155 02/05/20  1605   PROT 7.6  --   --   --   --    LABALBU 4.5  --   --   --   --    AST 35  --   --   --   --    ALT 56*  --   --   --   --    ALKPHOS 80  --   --   --   --    BILITOT 0.70  --   --   --   --    BILIDIR 0.16  --   --   --   --    AMYLASE 62  --   --   --   --    LIPASE 24  --   --   --   --    POCGLU  --  123* 110 97 120*       No results found for: SPECIAL  No results found for: CULTURE    No results found for: POCPH, PHART, PH, POCPCO2, NIW3UVU, PCO2, POCPO2, PO2ART, PO2, POCHCO3, VZY1RJN, HCO3, NBEA, PBEA, BEART, BE, THGBART, THB, ILH6YGC, NLXV0EPQ, U1CNPUTS, O2SAT, FIO2    Radiology:    Ct Abdomen Pelvis Wo Contrast Additional Contrast? None    Result Date: 2/4/2020  Obstructing 0.4 cm left UVJ calculus with hydroureteronephrosis. Additional ancillary nonacute findings as above. Xr Abdomen (kub) (single Ap View)    Result Date: 2/5/2020  Mildly increased gas in the intestinal tract favoring ileus. Punctate calcification in the midline in the lower pelvis likely calculus seen in the bladder on prior examination, CT of 1 day earlier. Fl Retrograde Pyelogram W Wo Kub    Result Date: 2/5/2020  Intraprocedural fluoroscopic spot images as above. See separate procedure report for more information. All radiological studies reviewed  Code Status:  Full Code        Electronically signed by Stewart Richards MD on 2/5/2020 at 6:36 PM    This note was created with the assistance of a speech-recognition program.  Although the intention is to generate a document that actually reflects the content of the visit, no guarantees can be provided that every mistake has been identified and corrected by editing. Note was updated later by me after  physical examination and  completion of the assessment.

## 2020-02-05 NOTE — H&P
History & Physical  [facility].,    Adult Hospitalist      Name: Han Moseley  MRN: [id number]     [place]: [de-identified]  Room: [place] [address]    Admit Date: 2/4/2020 10:19 PM  PCP: Yosef Galeas,     Primary Problem  Active Problems:    Ureteric colic  Resolved Problems:    * No resolved hospital problems. *        Assesment:     · Left Ureteric colic  · Hydronephrosis  · Essential hypertension  · Mixed hyperlipidemia  · Diabetes mellitus type 2  · Osteoarthritis multiple joints  · Mitral valve prolapse  · Cardiac murmur        Plan:     · Admit to [facility]  · Monitor vitals closely  · Keep SPO2 above 90%  · Pain control  · Morphine IV PRN  · Norco p.o. as needed  · IV fluids  · Flomax  · Stone analysis  · Consult urology  · Carvedilol with blood pressure and heart rate parameters  · Hold ramipril  · Hold Crestor  · Hold Glucophage  · accu checks bid  · ISS  · Clear liquids  · NPO after 2 am  · Incentive spirometry  · DVT and GI prophylaxis. Chief Complaint:     Chief Complaint   Patient presents with    Abdominal Pain     2000, lower    Back Pain         History of Present Illness:      Han Moseley is a 78 y.o.  male who presents with Abdominal Pain (2000, lower) and Back Pain    Patient says he had due to onset of no residual left abdomen on 10 PM tonight. Says pain was dull and constant. Pain radiated from his left flank to his groin. Times pain radiates to the middle lower part of his abdomen. Patient complains of nausea and diaphoresis at that time    Denies any chest pain or dyspnea. Denies neck pain or vision problems. Denies headache. Denies joint swelling or rash    The review of systems negative    I have personally reviewed the past medical history, past surgical history, medications, social history, and family history, and summarized in the note. Review of Systems:     All 10 point system is reviewed and negative otherwise mentioned in HPI.       Past Medical History:     Past Medical History:   Diagnosis Date    Arthritis     Diabetes mellitus ([facility] [address]     Hyperlipidemia     Hypertension         Past Surgical History:     Past Surgical History:   Procedure Laterality Date    KNEE SURGERY      ROTATOR CUFF REPAIR          Medications Prior to Admission:       Prior to Admission medications    Medication Sig Start Date End Date Taking? Authorizing Provider   ramipril (ALTACE) 10 MG capsule  20   Historical Provider, MD   carvedilol (COREG) 25 MG tablet  20   Historical Provider, MD   rosuvastatin (CRESTOR) 40 MG tablet  20   Historical Provider, MD   metFORMIN (GLUCOPHAGE) 500 MG tablet  20   Historical Provider, MD   aspirin 325 MG tablet Take 325 mg by mouth    Historical Provider, MD   B COMPLEX VITAMINS ER PO Take 1 tablet by mouth    Historical Provider, MD   Pediatric Multiple Vitamins (MULTIVITAMIN) LIQD oral solution Take 5 mLs by mouth    Historical Provider, MD   KRILL OIL PO Take by mouth    Historical Provider, MD   vitamin D-3 (CHOLECALCIFEROL) 125 MCG (5000 UT) TABS Take 5,000 Units by mouth    Historical Provider, MD   Acetylcysteine 600 MG CAPS Take 600 mg by mouth    Historical Provider, MD   Flaxseed, Linseed, (FLAXSEED OIL PO) Take by mouth    Historical Provider, MD   Coenzyme Q10 100 MG TABS Take 100 mg by mouth    Historical Provider, MD   Turmeric 500 MG CAPS Take by mouth    Historical Provider, MD        Allergies:       Patient has no known allergies. Social History:     Tobacco:    reports that he has never smoked. He has never used smokeless tobacco.  Alcohol:      reports previous alcohol use. Drug Use:  reports no history of drug use. Family History:     History reviewed. No pertinent family history.       Physical Exam:     Vitals:  BP (!) 158/60   Pulse 66   Temp 97.5 °F (36.4 °C) (Oral)   Resp 18   Ht 5' 9\" (1.753 m)   Wt 185 lb (83.9 kg)   SpO2 98%   BMI 27.32 kg/m²   Temp (24hrs), Av.5 °F (36.4 °C), Min:97.5 °F (36.4 °C), Max:97.5 °F (36.4 °C)          General appearance - alert, well appearing, and in acute distress. Uncomfortable and unable to stay still in bed  Mental status - oriented to person, place, and time with normal affect  Head - normocephalic and atraumatic  Eyes - pupils equal and reactive, extraocular eye movements intact, conjunctiva clear  Ears - hearing appears to be intact  Nose - no drainage noted  Mouth - mucous membranes moist  Neck - supple, no carotid bruits, thyroid not palpable  Chest - clear to auscultation, normal effort  Heart - normal rate, regular rhythm, murmur auscultated  Abdomen - soft, nontender, nondistended, bowel sounds present all four quadrants, no masses, hepatomegaly or splenomegaly  Neurological - normal speech, no focal findings or movement disorder noted, cranial nerves II through XII grossly intact  Extremities - peripheral pulses palpable, no pedal edema or calf pain with palpation  Skin - no gross lesions, rashes, or induration noted        Data:     Labs:    Hematology:  Recent Labs     02/04/20  2248   WBC 13.0*   RBC 4.80   HGB 15.2   HCT 45.4   MCV 94.6   MCH 31.7   MCHC 33.5   RDW 12.5      MPV 9.2     Chemistry:  Recent Labs     02/04/20  2248      K 4.4      CO2 25   GLUCOSE 155*   BUN 17   CREATININE 0.89   ANIONGAP 13   LABGLOM >60   GFRAA >60   CALCIUM 9.8     Recent Labs     02/04/20  2248   PROT 7.6   LABALBU 4.5   AST 35   ALT 56*   ALKPHOS 80   BILITOT 0.70   BILIDIR 0.16   AMYLASE 62   LIPASE 24       No results found for: INR, PROTIME    No results found for: SPECIAL  No results found for: CULTURE    No results found for: POCPH, PHART, PH, POCPCO2, ZRJ9RLK, PCO2, POCPO2, PO2ART, PO2, POCHCO3, HBD4QIB, HCO3, NBEA, PBEA, BEART, BE, THGBART, THB, RQV5YMI, QHZP5JDD, N1XFLFGZ, O2SAT, FIO2    Radiology:    Ct Abdomen Pelvis Wo Contrast Additional Contrast? None    Result Date: 2/4/2020  Obstructing 0.4 cm left UVJ calculus with hydroureteronephrosis.

## 2020-02-05 NOTE — PROGRESS NOTES
Pt admitted to room 2110 per cart in stable condition from ED  VSS  Oriented to room and surroundings  Bed in lowest position, wheels locked, 2/4 side rails up  Call light in reach, room free of clutter, adequate lighting provided  Pt is not a current smoker   Denies any further questions at this time  Instructed to call out with any questions/concerns/new onset of pain and/or n/v   White board updated  Continue to monitor with hourly rounding  Non-skid socks on/at bedside  [address] in place for patient/family to view & ask questions.

## 2020-02-05 NOTE — PLAN OF CARE
Problem: Pain:  Goal: Pain level will decrease  Description  Pain level will decrease  2/5/2020 1226 by Leander Winters RN  Outcome: Ongoing  Note:   Pain level assessment complete. Patient educated on pain scale and control interventions  PRN pain medication given per patient request  Patient instructed to call out with new onset of pain or unrelieved pain    2/5/2020 0330 by Hosea Hassan RN  Outcome: Ongoing  Note:   Pain level assessed and rated on a 0-10 scale  Assess characteristics of pain  PRN pain medication given per pt request  Non-pharmacological interventions implemented  Report ineffective pain management to physician  Update pt and family of any changes  Pt instructed to call out with new onset of pain  Continue to monitor    Goal: Control of acute pain  Description  Control of acute pain  Outcome: Ongoing  Goal: Control of chronic pain  Description  Control of chronic pain  Outcome: Ongoing     Problem: Falls - Risk of:  Goal: Will remain free from falls  Description  Will remain free from falls  Outcome: Ongoing  Note:   Siderails up x 2  Hourly rounding  Call light in reach  Instructed to call for assist before attempting out of bed.   Remains free from falls and accidental injury at this time   Floor free from obstacles  Bed is locked and in lowest position  Adequate lighting provided      Goal: Absence of physical injury  Description  Absence of physical injury  Outcome: Ongoing

## 2020-02-06 NOTE — CONSULTS
Substance and Sexual Activity    Alcohol use: Not Currently    Drug use: Never    Sexual activity: Not on file   Lifestyle    Physical activity:     Days per week: Not on file     Minutes per session: Not on file    Stress: Not on file   Relationships    Social connections:     Talks on phone: Not on file     Gets together: Not on file     Attends Sikhism service: Not on file     Active member of club or organization: Not on file     Attends meetings of clubs or organizations: Not on file     Relationship status: Not on file    Intimate partner violence:     Fear of current or ex partner: Not on file     Emotionally abused: Not on file     Physically abused: Not on file     Forced sexual activity: Not on file   Other Topics Concern    Not on file   Social History Narrative    Not on file       Family History:  History reviewed. No pertinent family history. Previous Urologic Family history: none    REVIEW OF SYSTEMS:  Constitutional: negative  Eyes: negative  Respiratory: negative  Cardiovascular: negative  Gastrointestinal: negative  Genitourinary: see HPI  Musculoskeletal: negative  Skin: negative   Neurological: negative  Hematological/Lymphatic: negative  Psychological: negative    Physical Exam:    This a 78 y.o. male   Patient Vitals for the past 24 hrs:   BP Temp Temp src Pulse Resp SpO2   02/05/20 1845 118/61 98.6 °F (37 °C) Oral 68 18 94 %   02/05/20 1608 (!) 99/50 97.9 °F (36.6 °C) Oral 70 18 92 %   02/05/20 1157 133/64 97.5 °F (36.4 °C) Oral 63 18 95 %   02/05/20 1115 137/77 97.2 °F (36.2 °C) Temporal 67 27 96 %   02/05/20 1100 128/62 -- -- 61 18 96 %   02/05/20 1045 124/66 -- -- 62 17 94 %   02/05/20 1030 126/67 -- -- 69 23 99 %   02/05/20 1025 126/72 97.5 °F (36.4 °C) Temporal 67 12 98 %     Constitutional: Patient in no acute distress; Neuro: alert and oriented to person place and time.     Psych: Mood and affect normal.  Skin: Normal  Lungs: Respiratory effort normal  Cardiovascular: Normal peripheral pulses  Abdomen: Soft, non-tender, non-distended with no CVA, flank pain, hepatosplenomegaly or hernia. Kidneys normal.  Bladder non-tender and not distended. Lymphatics: no palpable lymphadenopathy  Penis normal and circumcised  Urethral meatus normal  Scrotal exam normal  Testicles normal bilaterally  Epididymis normal bilaterally  No evidence of inguinal hernia  Anus and perineum normal  Normal rectal tone with no masses  Prostate soft, non-tender to palpation. No palpable nodules. Estimated 40 grams. LABS:  Recent Labs     02/04/20 2248 02/05/20 0527   WBC 13.0* 11.6*   HGB 15.2 14.1   HCT 45.4 41.1   MCV 94.6 94.3    212     Recent Labs     02/04/20 2248 02/05/20 0527    142   K 4.4 4.8    106   CO2 25 27   BUN 17 20   CREATININE 0.89 1.09     No results found for: PSA    Additional Lab/culture results:    Urinalysis:   Recent Labs     02/04/20 2328 02/05/20  1009   COLORU yellow YELLOW   PHUR 7.0 6.0   WBCUA  --  2 TO 5   RBCUA  --  50    MUCUS  --  NOT REPORTED   TRICHOMONAS  --  NOT REPORTED   YEAST  --  NOT REPORTED   BACTERIA  --  NOT REPORTED   CLARITYU 2+  --    SPECGRAV 1.025 1.014   LEUKOCYTESUR neg NEGATIVE   UROBILINOGEN  --  Normal   BILIRUBINUR neg NEGATIVE   BLOODU large  --         -----------------------------------------------------------------  Imaging Results:  Peritoneum/Retroperitoneum: No retroperitoneal adenopathy.  No free fluid. No free air.  Calcified plaquing of the aorta.  Normal caliber aorta.       Bones/Soft Tissues: Dependent lumbar edema.  No acute osseous abnormality. No aggressive osseous lesion.  Multilevel degenerative changes compatible   with age.           Impression   Obstructing 0.4 cm left UVJ calculus with hydroureteronephrosis.       Additional ancillary nonacute findings as above.             Assessment and Plan   Impression:    Patient Active Problem List   Diagnosis    Ureteric colic       Plan:  We will proceed with cystoscopy and l bilateral retrograde pyelogram as well as stent placement patient is agreeable consent form signed    Suzette Hall  8:15 AM 2/6/2020

## 2020-02-07 LAB
CULTURE: NO GROWTH
Lab: NORMAL
SPECIMEN DESCRIPTION: NORMAL

## 2020-02-13 ENCOUNTER — ANESTHESIA EVENT (OUTPATIENT)
Dept: OPERATING ROOM | Age: 70
End: 2020-02-13
Payer: MEDICARE

## 2020-02-13 ENCOUNTER — ANESTHESIA (OUTPATIENT)
Dept: OPERATING ROOM | Age: 70
End: 2020-02-13
Payer: MEDICARE

## 2020-02-13 ENCOUNTER — HOSPITAL ENCOUNTER (OUTPATIENT)
Age: 70
Setting detail: OUTPATIENT SURGERY
Discharge: HOME | End: 2020-02-13
Attending: UROLOGY | Admitting: UROLOGY
Payer: MEDICARE

## 2020-02-13 ENCOUNTER — APPOINTMENT (OUTPATIENT)
Dept: GENERAL RADIOLOGY | Age: 70
End: 2020-02-13
Attending: UROLOGY
Payer: MEDICARE

## 2020-02-13 VITALS — DIASTOLIC BLOOD PRESSURE: 64 MMHG | SYSTOLIC BLOOD PRESSURE: 138 MMHG | OXYGEN SATURATION: 99 % | TEMPERATURE: 95.4 F

## 2020-02-13 VITALS
BODY MASS INDEX: 27.47 KG/M2 | HEIGHT: 69 IN | TEMPERATURE: 97.5 F | SYSTOLIC BLOOD PRESSURE: 162 MMHG | OXYGEN SATURATION: 96 % | DIASTOLIC BLOOD PRESSURE: 96 MMHG | HEART RATE: 69 BPM | RESPIRATION RATE: 19 BRPM | WEIGHT: 185.5 LBS

## 2020-02-13 LAB
-: ABNORMAL
AMORPHOUS: ABNORMAL
BACTERIA: ABNORMAL
BILIRUBIN URINE: NEGATIVE
CASTS UA: ABNORMAL /LPF
COLOR: YELLOW
COMMENT UA: ABNORMAL
CRYSTALS, UA: ABNORMAL /HPF
EPITHELIAL CELLS UA: ABNORMAL /HPF (ref 0–5)
GLUCOSE BLD-MCNC: 86 MG/DL (ref 75–110)
GLUCOSE BLD-MCNC: 90 MG/DL (ref 75–110)
GLUCOSE URINE: NEGATIVE
KETONES, URINE: NEGATIVE
LEUKOCYTE ESTERASE, URINE: ABNORMAL
MUCUS: ABNORMAL
NITRITE, URINE: NEGATIVE
OTHER OBSERVATIONS UA: ABNORMAL
PH UA: 7 (ref 5–8)
PROTEIN UA: ABNORMAL
RBC UA: ABNORMAL /HPF (ref 0–2)
RENAL EPITHELIAL, UA: ABNORMAL /HPF
SPECIFIC GRAVITY UA: 1.01 (ref 1–1.03)
TRICHOMONAS: ABNORMAL
TURBIDITY: ABNORMAL
URINE HGB: ABNORMAL
UROBILINOGEN, URINE: NORMAL
WBC UA: ABNORMAL /HPF (ref 0–5)
YEAST: ABNORMAL

## 2020-02-13 PROCEDURE — 2580000003 HC RX 258: Performed by: ANESTHESIOLOGY

## 2020-02-13 PROCEDURE — C1758 CATHETER, URETERAL: HCPCS | Performed by: UROLOGY

## 2020-02-13 PROCEDURE — 7100000010 HC PHASE II RECOVERY - FIRST 15 MIN: Performed by: UROLOGY

## 2020-02-13 PROCEDURE — 3600000012 HC SURGERY LEVEL 2 ADDTL 15MIN: Performed by: UROLOGY

## 2020-02-13 PROCEDURE — 2580000003 HC RX 258: Performed by: UROLOGY

## 2020-02-13 PROCEDURE — 82947 ASSAY GLUCOSE BLOOD QUANT: CPT

## 2020-02-13 PROCEDURE — 7100000001 HC PACU RECOVERY - ADDTL 15 MIN: Performed by: UROLOGY

## 2020-02-13 PROCEDURE — 3209999900 FLUORO FOR SURGICAL PROCEDURES

## 2020-02-13 PROCEDURE — 2720000010 HC SURG SUPPLY STERILE: Performed by: UROLOGY

## 2020-02-13 PROCEDURE — 7100000000 HC PACU RECOVERY - FIRST 15 MIN: Performed by: UROLOGY

## 2020-02-13 PROCEDURE — 2709999900 HC NON-CHARGEABLE SUPPLY: Performed by: UROLOGY

## 2020-02-13 PROCEDURE — 74420 UROGRAPHY RTRGR +-KUB: CPT

## 2020-02-13 PROCEDURE — 6370000000 HC RX 637 (ALT 250 FOR IP): Performed by: UROLOGY

## 2020-02-13 PROCEDURE — 6360000002 HC RX W HCPCS: Performed by: UROLOGY

## 2020-02-13 PROCEDURE — 2500000003 HC RX 250 WO HCPCS: Performed by: SPECIALIST

## 2020-02-13 PROCEDURE — C2617 STENT, NON-COR, TEM W/O DEL: HCPCS | Performed by: UROLOGY

## 2020-02-13 PROCEDURE — 81001 URINALYSIS AUTO W/SCOPE: CPT

## 2020-02-13 PROCEDURE — C1769 GUIDE WIRE: HCPCS | Performed by: UROLOGY

## 2020-02-13 PROCEDURE — 87086 URINE CULTURE/COLONY COUNT: CPT

## 2020-02-13 PROCEDURE — 6360000002 HC RX W HCPCS

## 2020-02-13 PROCEDURE — 3700000001 HC ADD 15 MINUTES (ANESTHESIA): Performed by: UROLOGY

## 2020-02-13 PROCEDURE — 3600000002 HC SURGERY LEVEL 2 BASE: Performed by: UROLOGY

## 2020-02-13 PROCEDURE — 6360000002 HC RX W HCPCS: Performed by: SPECIALIST

## 2020-02-13 PROCEDURE — 74018 RADEX ABDOMEN 1 VIEW: CPT

## 2020-02-13 PROCEDURE — 7100000011 HC PHASE II RECOVERY - ADDTL 15 MIN: Performed by: UROLOGY

## 2020-02-13 PROCEDURE — 3700000000 HC ANESTHESIA ATTENDED CARE: Performed by: UROLOGY

## 2020-02-13 DEVICE — URETERAL STENT
Type: IMPLANTABLE DEVICE | Site: URETER | Status: FUNCTIONAL
Brand: POLARIS™ ULTRA

## 2020-02-13 RX ORDER — CIPROFLOXACIN 500 MG/1
500 TABLET, FILM COATED ORAL 2 TIMES DAILY
Qty: 10 TABLET | Refills: 0 | Status: SHIPPED | OUTPATIENT
Start: 2020-02-13 | End: 2020-02-18

## 2020-02-13 RX ORDER — SODIUM CHLORIDE, SODIUM LACTATE, POTASSIUM CHLORIDE, CALCIUM CHLORIDE 600; 310; 30; 20 MG/100ML; MG/100ML; MG/100ML; MG/100ML
INJECTION, SOLUTION INTRAVENOUS CONTINUOUS
Status: DISCONTINUED | OUTPATIENT
Start: 2020-02-13 | End: 2020-02-13 | Stop reason: HOSPADM

## 2020-02-13 RX ORDER — OXYCODONE HYDROCHLORIDE AND ACETAMINOPHEN 5; 325 MG/1; MG/1
2 TABLET ORAL PRN
Status: DISCONTINUED | OUTPATIENT
Start: 2020-02-13 | End: 2020-02-13 | Stop reason: HOSPADM

## 2020-02-13 RX ORDER — FENTANYL CITRATE 50 UG/ML
25 INJECTION, SOLUTION INTRAMUSCULAR; INTRAVENOUS EVERY 5 MIN PRN
Status: DISCONTINUED | OUTPATIENT
Start: 2020-02-13 | End: 2020-02-13 | Stop reason: HOSPADM

## 2020-02-13 RX ORDER — HYDROMORPHONE HCL 110MG/55ML
0.5 PATIENT CONTROLLED ANALGESIA SYRINGE INTRAVENOUS EVERY 5 MIN PRN
Status: DISCONTINUED | OUTPATIENT
Start: 2020-02-13 | End: 2020-02-13 | Stop reason: HOSPADM

## 2020-02-13 RX ORDER — NIACINAMIDE 500 MG
500 TABLET ORAL 2 TIMES DAILY WITH MEALS
COMMUNITY

## 2020-02-13 RX ORDER — HYDROCODONE BITARTRATE AND ACETAMINOPHEN 5; 325 MG/1; MG/1
1 TABLET ORAL EVERY 6 HOURS PRN
Qty: 12 TABLET | Refills: 0 | Status: SHIPPED | OUTPATIENT
Start: 2020-02-13 | End: 2020-02-16

## 2020-02-13 RX ORDER — PROMETHAZINE HYDROCHLORIDE 25 MG/ML
6.25 INJECTION, SOLUTION INTRAMUSCULAR; INTRAVENOUS
Status: DISCONTINUED | OUTPATIENT
Start: 2020-02-13 | End: 2020-02-13 | Stop reason: HOSPADM

## 2020-02-13 RX ORDER — LIDOCAINE HYDROCHLORIDE 20 MG/ML
JELLY TOPICAL PRN
Status: DISCONTINUED | OUTPATIENT
Start: 2020-02-13 | End: 2020-02-13 | Stop reason: ALTCHOICE

## 2020-02-13 RX ORDER — PROPOFOL 10 MG/ML
INJECTION, EMULSION INTRAVENOUS PRN
Status: DISCONTINUED | OUTPATIENT
Start: 2020-02-13 | End: 2020-02-13 | Stop reason: SDUPTHER

## 2020-02-13 RX ORDER — ONDANSETRON 2 MG/ML
4 INJECTION INTRAMUSCULAR; INTRAVENOUS
Status: DISCONTINUED | OUTPATIENT
Start: 2020-02-13 | End: 2020-02-13 | Stop reason: HOSPADM

## 2020-02-13 RX ORDER — PHENYLEPHRINE HCL IN 0.9% NACL 1 MG/10 ML
SYRINGE (ML) INTRAVENOUS PRN
Status: DISCONTINUED | OUTPATIENT
Start: 2020-02-13 | End: 2020-02-13 | Stop reason: SDUPTHER

## 2020-02-13 RX ORDER — OXYCODONE HYDROCHLORIDE AND ACETAMINOPHEN 5; 325 MG/1; MG/1
1 TABLET ORAL PRN
Status: DISCONTINUED | OUTPATIENT
Start: 2020-02-13 | End: 2020-02-13 | Stop reason: HOSPADM

## 2020-02-13 RX ORDER — FENTANYL CITRATE 50 UG/ML
INJECTION, SOLUTION INTRAMUSCULAR; INTRAVENOUS PRN
Status: DISCONTINUED | OUTPATIENT
Start: 2020-02-13 | End: 2020-02-13 | Stop reason: SDUPTHER

## 2020-02-13 RX ORDER — ONDANSETRON 2 MG/ML
INJECTION INTRAMUSCULAR; INTRAVENOUS PRN
Status: DISCONTINUED | OUTPATIENT
Start: 2020-02-13 | End: 2020-02-13 | Stop reason: SDUPTHER

## 2020-02-13 RX ORDER — M-VIT,TX,IRON,MINS/CALC/FOLIC 27MG-0.4MG
1 TABLET ORAL DAILY
COMMUNITY

## 2020-02-13 RX ORDER — FENTANYL CITRATE 50 UG/ML
INJECTION, SOLUTION INTRAMUSCULAR; INTRAVENOUS
Status: COMPLETED
Start: 2020-02-13 | End: 2020-02-13

## 2020-02-13 RX ORDER — LABETALOL HYDROCHLORIDE 5 MG/ML
5 INJECTION, SOLUTION INTRAVENOUS EVERY 10 MIN PRN
Status: DISCONTINUED | OUTPATIENT
Start: 2020-02-13 | End: 2020-02-13 | Stop reason: HOSPADM

## 2020-02-13 RX ORDER — GLYCOPYRROLATE 1 MG/5 ML
SYRINGE (ML) INTRAVENOUS PRN
Status: DISCONTINUED | OUTPATIENT
Start: 2020-02-13 | End: 2020-02-13 | Stop reason: SDUPTHER

## 2020-02-13 RX ORDER — ASPIRIN 325 MG
325 TABLET ORAL DAILY
COMMUNITY

## 2020-02-13 RX ORDER — HYDRALAZINE HYDROCHLORIDE 20 MG/ML
5 INJECTION INTRAMUSCULAR; INTRAVENOUS EVERY 10 MIN PRN
Status: DISCONTINUED | OUTPATIENT
Start: 2020-02-13 | End: 2020-02-13 | Stop reason: HOSPADM

## 2020-02-13 RX ADMIN — ONDANSETRON 4 MG: 2 INJECTION, SOLUTION INTRAMUSCULAR; INTRAVENOUS at 14:30

## 2020-02-13 RX ADMIN — PROPOFOL 150 MG: 10 INJECTION, EMULSION INTRAVENOUS at 14:26

## 2020-02-13 RX ADMIN — Medication 50 MCG: at 14:26

## 2020-02-13 RX ADMIN — SODIUM CHLORIDE, POTASSIUM CHLORIDE, SODIUM LACTATE AND CALCIUM CHLORIDE: 600; 310; 30; 20 INJECTION, SOLUTION INTRAVENOUS at 13:34

## 2020-02-13 RX ADMIN — Medication 100 MCG: at 14:58

## 2020-02-13 RX ADMIN — FENTANYL CITRATE 25 MCG: 50 INJECTION, SOLUTION INTRAMUSCULAR; INTRAVENOUS at 16:00

## 2020-02-13 RX ADMIN — SODIUM CHLORIDE, POTASSIUM CHLORIDE, SODIUM LACTATE AND CALCIUM CHLORIDE: 600; 310; 30; 20 INJECTION, SOLUTION INTRAVENOUS at 14:21

## 2020-02-13 RX ADMIN — Medication 50 MCG: at 15:06

## 2020-02-13 RX ADMIN — DEXTROSE MONOHYDRATE 2 G: 50 INJECTION, SOLUTION INTRAVENOUS at 14:28

## 2020-02-13 RX ADMIN — Medication 0.3 MG: at 14:47

## 2020-02-13 RX ADMIN — Medication 200 MCG: at 14:49

## 2020-02-13 ASSESSMENT — PULMONARY FUNCTION TESTS
PIF_VALUE: 3
PIF_VALUE: 5
PIF_VALUE: 3
PIF_VALUE: 1
PIF_VALUE: 15
PIF_VALUE: 4
PIF_VALUE: 7
PIF_VALUE: 3
PIF_VALUE: 3
PIF_VALUE: 1
PIF_VALUE: 3
PIF_VALUE: 15
PIF_VALUE: 15
PIF_VALUE: 1
PIF_VALUE: 11
PIF_VALUE: 3
PIF_VALUE: 3
PIF_VALUE: 1
PIF_VALUE: 3
PIF_VALUE: 4
PIF_VALUE: 2
PIF_VALUE: 8
PIF_VALUE: 1
PIF_VALUE: 3
PIF_VALUE: 1
PIF_VALUE: 3
PIF_VALUE: 4
PIF_VALUE: 3
PIF_VALUE: 15
PIF_VALUE: 13
PIF_VALUE: 3
PIF_VALUE: 9
PIF_VALUE: 3
PIF_VALUE: 5
PIF_VALUE: 15
PIF_VALUE: 11
PIF_VALUE: 1
PIF_VALUE: 3
PIF_VALUE: 4
PIF_VALUE: 15
PIF_VALUE: 3
PIF_VALUE: 6
PIF_VALUE: 9
PIF_VALUE: 8
PIF_VALUE: 15
PIF_VALUE: 7
PIF_VALUE: 15
PIF_VALUE: 4
PIF_VALUE: 3
PIF_VALUE: 9
PIF_VALUE: 1

## 2020-02-13 ASSESSMENT — PAIN DESCRIPTION - LOCATION
LOCATION: BACK

## 2020-02-13 ASSESSMENT — PAIN DESCRIPTION - DESCRIPTORS
DESCRIPTORS: SORE

## 2020-02-13 ASSESSMENT — PAIN SCALES - GENERAL
PAINLEVEL_OUTOF10: 3
PAINLEVEL_OUTOF10: 2
PAINLEVEL_OUTOF10: 6

## 2020-02-13 ASSESSMENT — PAIN DESCRIPTION - FREQUENCY
FREQUENCY: CONTINUOUS

## 2020-02-13 ASSESSMENT — PAIN DESCRIPTION - ONSET
ONSET: ON-GOING
ONSET: AWAKENED FROM SLEEP
ONSET: ON-GOING

## 2020-02-13 ASSESSMENT — PAIN DESCRIPTION - ORIENTATION
ORIENTATION: LOWER

## 2020-02-13 ASSESSMENT — PAIN - FUNCTIONAL ASSESSMENT: PAIN_FUNCTIONAL_ASSESSMENT: 0-10

## 2020-02-13 ASSESSMENT — PAIN DESCRIPTION - PAIN TYPE
TYPE: ACUTE PAIN

## 2020-02-13 ASSESSMENT — PAIN DESCRIPTION - PROGRESSION
CLINICAL_PROGRESSION: RAPIDLY IMPROVING
CLINICAL_PROGRESSION: GRADUALLY WORSENING

## 2020-02-13 NOTE — ANESTHESIA PRE PROCEDURE
medications:    Current Facility-Administered Medications   Medication Dose Route Frequency Provider Last Rate Last Dose    lactated ringers infusion   Intravenous Continuous Kaaren Kawasaki,  mL/hr at 02/13/20 1334      lidocaine 1% (buffered) injection 0.5 mL  0.5 mL Intradermal Once Keshawn Mitchell DO        ceFAZolin (ANCEF) 2 g in dextrose 5 % 50 mL IVPB  2 g Intravenous Once Mey Obregon MD           Allergies:  No Known Allergies    Problem List:    Patient Active Problem List   Diagnosis Code    Ureteric colic M48       Past Medical History:        Diagnosis Date    Arthritis     Diabetes mellitus ([address]     Hyperlipidemia     Hypertension        Past Surgical History:        Procedure Laterality Date    CYSTOSCOPY  02/05/2020    Cystoscopy Bilateral Retrograde Pyelogram Left Ureteral Stent Insertion    CYSTOSCOPY INSERTION / REMOVAL STENT / STONE N/A 2/5/2020    CYSTOSCOPY BILATERAL RETROGRADE PYELOGRAM LEFT [facility]TER[facility] ST[facility]         Social History:    Social History     Tobacco Use    Smoking status: Never Smoker    Smokeless tobacco: Never Used   Substance Use Topics    Alcohol use: Not Currently                                Counseling given: Not Answered      Vital Signs (Current):   Vitals:    02/13/20 1301   BP: (!) 158/74   Pulse: 78   Resp: 16   Temp: 36.5 °C (97.7 °F)   TempSrc: Oral   SpO2: 98%   Weight: 185 lb 8 oz (84.1 kg)   Height: 5' 9\" (1.753 m)                                              BP Readings from Last 3 Encounters:   02/13/20 (!) 158/74   02/05/20 118/61   02/05/20 (!) 118/56       NPO Status: Time of last liquid consumption: 2230                        Time of last solid consumption: 1900                        Date of last liquid consumption: 02/12/20                        Date of last solid food consumption: 02/12/20    BMI:   Wt Readings from Last 3 discussed with attending.                   Anastasia Fam, APRN - CRNA   2/13/2020

## 2020-02-13 NOTE — ANESTHESIA PRE PROCEDURE
Department of Anesthesiology  Preprocedure Note       Name:  David Infante   Age:  78 y.o.  :  1950                                          MRN:  [id number]         Date:  2020      Surgeon: Mel Pelayo):  Mónica Segovia MD    Procedure: Alice Sona HOLMIUM LASER LITHOTRIPSY (Left )    Medications prior to admission:   Prior to Admission medications    Medication Sig Start Date End Date Taking?  Authorizing Provider   Multiple Vitamins-Minerals (THERAPEUTIC MULTIVITAMIN-MINERALS) tablet Take 1 tablet by mouth daily   Yes Historical Provider, MD   aspirin 325 MG tablet Take 325 mg by mouth daily   Yes Historical Provider, MD   UNABLE TO FIND Take 1 tablet by mouth daily Mg Nattokinase 2000 IU   Yes Historical Provider, MD   niacinamide 500 MG tablet Take 500 mg by mouth 2 times daily (with meals)   Yes Historical Provider, MD   ramipril (ALTACE) 10 MG capsule Take by mouth 2 times daily  20  Yes Historical Provider, MD   carvedilol (COREG) 25 MG tablet Take 25 mg by mouth 2 times daily  20  Yes Historical Provider, MD   rosuvastatin (CRESTOR) 40 MG tablet Take 40 mg by mouth daily  20  Yes Historical Provider, MD   metFORMIN (GLUCOPHAGE) 500 MG tablet Take 500 mg by mouth daily (with breakfast)  20  Yes Historical Provider, MD   B COMPLEX VITAMINS ER PO Take 1 tablet by mouth daily    Yes Historical Provider, MD   KRILL OIL PO Take 1,000 mg by mouth 2 times daily    Yes Historical Provider, MD   vitamin D-3 (CHOLECALCIFEROL) 125 MCG (5000 UT) TABS Take 5,000 Units by mouth 2 times daily    Yes Historical Provider, MD   Flaxseed, Linseed, (FLAXSEED OIL PO) Take 40 mg by mouth daily    Yes Historical Provider, MD   Coenzyme Q10 100 MG TABS Take 100 mg by mouth 2 times daily    Yes Historical Provider, MD   Turmeric 500 MG CAPS Take by mouth 2 times daily    Yes Historical Provider, MD   Acetylcysteine 600 MG CAPS Take 600 mg by mouth daily     Historical Provider, MD       Current Encounters:   02/13/20 185 lb 8 oz (84.1 kg)   02/05/20 184 lb 3.2 oz (83.6 kg)     Body mass index is 27.39 kg/m². CBC:   Lab Results   Component Value Date    WBC 11.6 02/05/2020    RBC 4.36 02/05/2020    HGB 14.1 02/05/2020    HCT 41.1 02/05/2020    MCV 94.3 02/05/2020    RDW 12.6 02/05/2020     02/05/2020       CMP:   Lab Results   Component Value Date     02/05/2020    K 4.8 02/05/2020     02/05/2020    CO2 27 02/05/2020    BUN 20 02/05/2020    CREATININE 1.09 02/05/2020    GFRAA >60 02/05/2020    LABGLOM >60 02/05/2020    GLUCOSE 132 02/05/2020    PROT 7.6 02/04/2020    CALCIUM 9.1 02/05/2020    BILITOT 0.70 02/04/2020    ALKPHOS 80 02/04/2020    AST 35 02/04/2020    ALT 56 02/04/2020       POC Tests:   Recent Labs     02/13/20  1324   POCGLU 90       Coags: No results found for: PROTIME, INR, APTT    HCG (If Applicable): No results found for: PREGTESTUR, PREGSERUM, HCG, HCGQUANT     ABGs: No results found for: PHART, PO2ART, QLB9QVN, IPI1FNL, BEART, X4XUDKCP     Type & Screen (If Applicable):  No results found for: LABABO, [address]    Anesthesia Evaluation  Patient summary reviewed and Nursing notes reviewed no history of anesthetic complications:   Airway: Mallampati: II  TM distance: >3 FB   Neck ROM: full  Mouth opening: > = 3 FB Dental: normal exam         Pulmonary:normal exam        (-) COPD and asthma                           Cardiovascular:  Exercise tolerance: no interval change,   (+) hypertension:, hyperlipidemia    (-) CAD and CABG/stent        Rate: normal                    Neuro/Psych:               GI/Hepatic/Renal:   (+) renal disease: kidney stones,      (-) GERD       Endo/Other:    (+) Diabetes, : arthritis:., .                 Abdominal:           Vascular:                                        Anesthesia Plan      general     ASA 2       Induction: intravenous. Anesthetic plan and risks discussed with patient. Plan discussed with CRNA.     Attending

## 2020-02-13 NOTE — H&P
History and Physical Update    Pt Name: Nick Cabrera  MRN: [id number]  YOB: 1950  Date of evaluation: 2/13/2020      [x] I have reviewed the H&P by Dr. Chi Gomez on 2/4/20  which meets the criteria for an Interval History and Physical note and is attached below. [x] I have examined  Nick Cabrera  There are no changes to the patient who is scheduled for a cystoscopy with stenting and lithotripsy by Dr. Kate Valladares. The patient denies health changes, fever, chills, productive cough, SOB, skin changes or chest pain. The patient also denies taking any blood thinning medications in the past 5 days other than Krill oil yesterday. Vital signs: BP (!) 158/74   Pulse 78   Temp 97.7 °F (36.5 °C) (Oral)   Resp 16   Ht 5' 9\" (1.753 m)   Wt 185 lb 8 oz (84.1 kg)   SpO2 98%   BMI 27.39 kg/m²     Allergies:  Patient has no known allergies. Medications:    Prior to Admission medications    Medication Sig Start Date End Date Taking?  Authorizing Provider   Multiple Vitamins-Minerals (THERAPEUTIC MULTIVITAMIN-MINERALS) tablet Take 1 tablet by mouth daily   Yes Historical Provider, MD   aspirin 81 MG tablet Take 81 mg by mouth daily   Yes Historical Provider, MD   ramipril (ALTACE) 10 MG capsule Take by mouth 2 times daily  1/12/20  Yes Historical Provider, MD   carvedilol (COREG) 25 MG tablet Take 25 mg by mouth 2 times daily  1/12/20  Yes Historical Provider, MD   rosuvastatin (CRESTOR) 40 MG tablet Take 40 mg by mouth daily  1/12/20  Yes Historical Provider, MD   metFORMIN (GLUCOPHAGE) 500 MG tablet Take 500 mg by mouth daily (with breakfast)  1/17/20  Yes Historical Provider, MD   B COMPLEX VITAMINS ER PO Take 1 tablet by mouth daily    Yes Historical Provider, MD   KRILL OIL PO Take 1,000 mg by mouth 2 times daily    Yes Historical Provider, MD   vitamin D-3 (CHOLECALCIFEROL) 125 MCG (5000 UT) TABS Take 5,000 Units by mouth 2 times daily    Yes Historical Provider, MD   Flaxseed, Linseed, (FLAXSEED OIL PO) Take by mouth daily    Yes Historical Provider, MD   Coenzyme Q10 100 MG TABS Take 100 mg by mouth 2 times daily    Yes Historical Provider, MD   Turmeric 500 MG CAPS Take by mouth 2 times daily    Yes Historical Provider, MD   Acetylcysteine 600 MG CAPS Take 600 mg by mouth    Historical Provider, MD       This is a 78 y.o. male who is pleasant, cooperative, alert and oriented x3, in no acute distress. Heart: Heart sounds are normal.  HR regular rate and rhythm without murmur, gallop or rub. Lungs: Normal respiratory effort with good air exchange and clear to auscultation without wheezes or rales bilaterally   Abdomen: soft, nontender, nondistended with bowel sounds . Labs:  Recent Labs     02/05/20  0527 02/04/20  2248   HGB 14.1 15.2   HCT 41.1 45.4   WBC 11.6* 13.0*   MCV 94.3 94.6    247    140   K 4.8 4.4    102   CO2 27 25   BUN 20 17   CREATININE 1.09 0.89   GLUCOSE 132* 155*   AST  --  35   ALT  --  56*   LABALBU  --  4.5       CHAY Rooney, ANP-BC  Electronically signed 2/13/2020 at 1:25 PM          Markos Rodriguez MD   Physician   Family Medicine   H&P   Signed   Date of Service:  2/4/2020 11:58 PM          Related encounter: ED to Hosp-Admission (Discharged) from 2/4/2020 in [address] Collapse All     Show:Clear all  [x]Manual[x]Template[]Copied    Added by:  Boris Michael MD    [][facility] for details    History & Physical  [facility].,    Adult Hospitalist        Name: Scooter Santiago  MRN:   [id number]                                    [place]:   [de-identified]  Room: [place] [address]     Admit Date: 2/4/2020 10:19 PM  PCP: Briseyda Lowery, DO     Primary Problem  Active Problems:    Ureteric colic  Resolved Problems:    * No resolved hospital problems.  *           Assesment:      · Left Ureteric colic  · Hydronephrosis  · Essential hypertension  · Mixed hyperlipidemia  · Diabetes mellitus type 2  · Osteoarthritis multiple joints  · Mitral valve ramipril (ALTACE) 10 MG capsule   20     Historical Provider, MD   carvedilol (COREG) 25 MG tablet   20     Historical Provider, MD   rosuvastatin (CRESTOR) 40 MG tablet   20     Historical Provider, MD   metFORMIN (GLUCOPHAGE) 500 MG tablet   20     Historical Provider, MD   aspirin 325 MG tablet Take 325 mg by mouth       Historical Provider, MD   B COMPLEX VITAMINS ER PO Take 1 tablet by mouth       Historical Provider, MD   Pediatric Multiple Vitamins (MULTIVITAMIN) LIQD oral solution Take 5 mLs by mouth       Historical Provider, MD   KRILL OIL PO Take by mouth       Historical Provider, MD   vitamin D-3 (CHOLECALCIFEROL) 125 MCG (5000 UT) TABS Take 5,000 Units by mouth       Historical Provider, MD   Acetylcysteine 600 MG CAPS Take 600 mg by mouth       Historical Provider, MD   Flaxseed, Linseed, (FLAXSEED OIL PO) Take by mouth       Historical Provider, MD   Coenzyme Q10 100 MG TABS Take 100 mg by mouth       Historical Provider, MD   Turmeric 500 MG CAPS Take by mouth       Historical Provider, MD            Allergies:       Patient has no known allergies. Social History:      Tobacco:    reports that he has never smoked. He has never used smokeless tobacco.  Alcohol:      reports previous alcohol use. Drug Use:  reports no history of drug use. Family History:      Family History   History reviewed. No pertinent family history. Physical Exam:      Vitals:  BP (!) 158/60   Pulse 66   Temp 97.5 °F (36.4 °C) (Oral)   Resp 18   Ht 5' 9\" (1.753 m)   Wt 185 lb (83.9 kg)   SpO2 98%   BMI 27.32 kg/m²   Temp (24hrs), Av.5 °F (36.4 °C), Min:97.5 °F (36.4 °C), Max:97.5 °F (36.4 °C)              General appearance - alert, well appearing, and in acute distress.   Uncomfortable and unable to stay still in bed  Mental status - oriented to person, place, and time with normal affect  Head - normocephalic and atraumatic  Eyes - pupils equal and reactive, extraocular eye the assistance of a speech-recognition program.  Although the intention is to generate a document that actually reflects the content of the visit, no guarantees can be provided that every mistake has been identified and corrected by editing. Note was updated later by me after  physical examination and  completion of the assessment.                 Routing History

## 2020-02-13 NOTE — OP NOTE
54 [facility]    Operative Note    Denise Sierra  YOB: 1950  [id number]      Pre-operative Diagnosis: Ureteral calculus left    Post-operative Diagnosis: Ureteral calculus, with left renal calculus    Procedure:   Cystoscopy, with left stent exchange  left ureteroscopy with laser lithotripsy ureteral calculus  , left flexible nephroscopy with laser lithotripsy renal calculus     Anesthesia: General and Local    Surgeons/Assistants: Dr Izzy Cristina    Estimated Blood Loss: None    Complications: None    Specimens: Was Obtained: Urine with stone fragments    Indications: This patient is 78years old, he was seen in consultation with ureteral colic and a calculus obstructing the left ureter    The patient had an emergency cystoscopy with pyelogram and left stent placement    He returns today for laser lithotripsy    The patient was also found to have a small renal calculus in the lower pole calyx left kidney    Operative Findings: Patient was brought to the operating room, positioned in dorsal lithotomy, proper patient identification and procedure identification, prepping and draping in the usual sterile manner, we entered the bladder with the cystoscope, we examined the bladder with a 30 degree lens and the 70 degree lens, there were no stones in the bladder, the ureteral stent noted in the left ureter was gently removed, Glidewire inserted in the left ureter,    Using a dual lumen catheter a second Glidewire was also inserted and positioned in the renal pelvis. We used the digital flexible ureteroscope. Left ureteroscopy was carried out, and the stone noted in the left ureter was identified, multiple documenting pictures were obtained we then proceeded with using the holmium laser 200 µm fiber and laser lithotripsy successfully completed.     Proximal ureteroscopy was then performed and then we entered the renal pelvis and the calyces, careful nephroscopy and examination of the calyces demonstrated a second stone lodged in the lower pole calyx, this was also treated with the holmium laser without any complications with good stone fragmentation. At the completion the ureteroscope was removed, a double-J stent was inserted over the Glidewire, positioned in the renal pelvis with the distal end in the bladder. The stent is on a string that was secured, patient was returned to recovery room in stable condition.     Recommendations    Follow-up visit at the office for stent removal        Electronically signed by Sampson Hurley MD on 2/13/2020 at 3:37 PM

## 2020-02-15 LAB
CULTURE: NO GROWTH
Lab: NORMAL
SPECIMEN DESCRIPTION: NORMAL

## 2020-02-16 NOTE — DISCHARGE SUMMARY
gross lesions, rashes, or induration noted      Consults:  IP CONSULT TO HOSPITALIST  IP CONSULT TO UROLOGY  IP CONSULT TO UROLOGY    Disposition: Home    Discharged Condition: Stable    Follow Up: Cayden May DO  [address] [facility] [address] [facility] [address] [phone]                  Diet: No diet orders on file    Discharge Medications:    Mort Parcel   Home Medication Instructions SSW:[id number]    Printed on:02/16/20 1257   Medication Information                      Acetylcysteine 600 MG CAPS  Take 600 mg by mouth daily              B COMPLEX VITAMINS ER PO  Take 1 tablet by mouth daily              carvedilol (COREG) 25 MG tablet  Take 25 mg by mouth 2 times daily              Coenzyme Q10 100 MG TABS  Take 100 mg by mouth 2 times daily              Flaxseed, Linseed, (FLAXSEED OIL PO)  Take 40 mg by mouth daily              KRILL OIL PO  Take 1,000 mg by mouth 2 times daily              metFORMIN (GLUCOPHAGE) 500 MG tablet  Take 500 mg by mouth daily (with breakfast)              ramipril (ALTACE) 10 MG capsule  Take by mouth 2 times daily              rosuvastatin (CRESTOR) 40 MG tablet  Take 40 mg by mouth daily              Turmeric 500 MG CAPS  Take by mouth 2 times daily              vitamin D-3 (CHOLECALCIFEROL) 125 MCG (5000 UT) TABS  Take 5,000 Units by mouth 2 times daily                  Code Status:  Prior    Time Spent on discharge is  15 mins in patient examination, evaluation, counseling as well as medication reconciliation, prescriptions for required medications, discharge plan and follow up. Electronically signed by Stewart Richards MD on 2/16/2020 at 12:57 PM     Thank you Dr. Cayden May DO for the opportunity to be involved in this patient's care.     This note was created with the assistance of a speech-recognition program.  Although the intention is to generate a document that actually reflects the content of the visit, no guarantees can be provided that every mistake has

## (undated) DEVICE — TOWEL,OR,DSP,ST,BLUE,DLX,XR,4/PK,20PK/CS: Brand: MEDLINE

## (undated) DEVICE — SYRINGE 20ML LL S/C 50

## (undated) DEVICE — CONE TIP URETERAL CATHETER WITH OPEN-END: Brand: CONE TIP

## (undated) DEVICE — STRIP,CLOSURE,WOUND,MEDI-STRIP,1/2X4: Brand: MEDLINE

## (undated) DEVICE — SINGLE-USE DIGITAL FLEXIBLE URETEROSCOPE: Brand: LITHOVUE

## (undated) DEVICE — MASTISOL ADHESIVE LIQ 2/3ML

## (undated) DEVICE — MEDICINE CUP, GRADUATED, STER: Brand: MEDLINE

## (undated) DEVICE — DUAL LUMEN URETERAL CATHETER

## (undated) DEVICE — GLOVE SURG SZ 8 CRM LTX FREE POLYISOPRENE POLYMER BEAD ANTI

## (undated) DEVICE — GLOVE SURG SZ 7 CRM LTX FREE POLYISOPRENE POLYMER BEAD ANTI

## (undated) DEVICE — RADIFOCUS GLIDEWIRE: Brand: GLIDEWIRE

## (undated) DEVICE — Device

## (undated) DEVICE — Z INACTIVE USE 2660664 SOLUTION IRRIG 3000ML 0.9% SOD CHL USP UROMATIC PLAS CONT

## (undated) DEVICE — CHECK-FLO HEMOSTASIS ASSEMBLY: Brand: CHECK-FLO

## (undated) DEVICE — Z INACTIVE USE 2635503 SOLUTION IRRIG 3000ML ST H2O USP UROMATIC PLAS CONT

## (undated) DEVICE — SINGLE ACTION PUMPING SYSTEM

## (undated) DEVICE — GLOVE SURG SZ 75 CRM LTX FREE POLYISOPRENE POLYMER BEAD ANTI